# Patient Record
Sex: FEMALE | Race: BLACK OR AFRICAN AMERICAN | NOT HISPANIC OR LATINO | Employment: FULL TIME | URBAN - METROPOLITAN AREA
[De-identification: names, ages, dates, MRNs, and addresses within clinical notes are randomized per-mention and may not be internally consistent; named-entity substitution may affect disease eponyms.]

---

## 2019-07-29 ENCOUNTER — OFFICE VISIT (OUTPATIENT)
Dept: PRIMARY CARE CLINIC | Facility: CLINIC | Age: 69
End: 2019-07-29
Payer: COMMERCIAL

## 2019-07-29 ENCOUNTER — TELEPHONE (OUTPATIENT)
Dept: SURGERY | Facility: CLINIC | Age: 69
End: 2019-07-29

## 2019-07-29 VITALS
RESPIRATION RATE: 18 BRPM | OXYGEN SATURATION: 98 % | BODY MASS INDEX: 26.46 KG/M2 | TEMPERATURE: 98 F | HEART RATE: 60 BPM | HEIGHT: 64 IN | DIASTOLIC BLOOD PRESSURE: 79 MMHG | SYSTOLIC BLOOD PRESSURE: 178 MMHG | WEIGHT: 155 LBS

## 2019-07-29 DIAGNOSIS — Z12.39 BREAST CANCER SCREENING: ICD-10-CM

## 2019-07-29 DIAGNOSIS — L20.9 ATOPIC DERMATITIS, UNSPECIFIED TYPE: ICD-10-CM

## 2019-07-29 DIAGNOSIS — Z23 NEED FOR VACCINATION: ICD-10-CM

## 2019-07-29 DIAGNOSIS — N28.1 RENAL CYST, RIGHT: ICD-10-CM

## 2019-07-29 DIAGNOSIS — I10 ESSENTIAL HYPERTENSION, BENIGN: Primary | ICD-10-CM

## 2019-07-29 DIAGNOSIS — M89.9 DISORDER OF BONE: ICD-10-CM

## 2019-07-29 DIAGNOSIS — Z11.59 NEED FOR HEPATITIS C SCREENING TEST: ICD-10-CM

## 2019-07-29 DIAGNOSIS — E55.9 VITAMIN D DEFICIENCY: ICD-10-CM

## 2019-07-29 DIAGNOSIS — E78.5 HYPERLIPIDEMIA, UNSPECIFIED HYPERLIPIDEMIA TYPE: ICD-10-CM

## 2019-07-29 DIAGNOSIS — R10.9 LEFT FLANK PAIN: ICD-10-CM

## 2019-07-29 DIAGNOSIS — Z12.11 COLON CANCER SCREENING: ICD-10-CM

## 2019-07-29 LAB
BILIRUB SERPL-MCNC: ABNORMAL MG/DL
BLOOD URINE, POC: ABNORMAL
COLOR, POC UA: YELLOW
GLUCOSE UR QL STRIP: ABNORMAL
KETONES UR QL STRIP: ABNORMAL
LEUKOCYTE ESTERASE URINE, POC: ABNORMAL
NITRITE, POC UA: ABNORMAL
PH, POC UA: 6
PROTEIN, POC: ABNORMAL
SPECIFIC GRAVITY, POC UA: 1.01
UROBILINOGEN, POC UA: ABNORMAL

## 2019-07-29 PROCEDURE — 90714 TD VACC NO PRESV 7 YRS+ IM: CPT | Mod: S$GLB,,, | Performed by: FAMILY MEDICINE

## 2019-07-29 PROCEDURE — 81001 URINALYSIS AUTO W/SCOPE: CPT | Mod: S$GLB,,, | Performed by: FAMILY MEDICINE

## 2019-07-29 PROCEDURE — 90472 TD VACCINE GREATER THAN OR EQUAL TO 7YO PRESERVATIVE FREE IM: ICD-10-PCS | Mod: S$GLB,,, | Performed by: FAMILY MEDICINE

## 2019-07-29 PROCEDURE — 90670 PNEUMOCOCCAL CONJUGATE VACCINE 13-VALENT LESS THAN 5YO & GREATER THAN: ICD-10-PCS | Mod: S$GLB,,, | Performed by: FAMILY MEDICINE

## 2019-07-29 PROCEDURE — 81001 POCT URINALYSIS, DIPSTICK OR TABLET REAGENT, AUTOMATED, WITH MICROSCOP: ICD-10-PCS | Mod: S$GLB,,, | Performed by: FAMILY MEDICINE

## 2019-07-29 PROCEDURE — 90670 PCV13 VACCINE IM: CPT | Mod: S$GLB,,, | Performed by: FAMILY MEDICINE

## 2019-07-29 PROCEDURE — 90714 TD VACCINE GREATER THAN OR EQUAL TO 7YO PRESERVATIVE FREE IM: ICD-10-PCS | Mod: S$GLB,,, | Performed by: FAMILY MEDICINE

## 2019-07-29 PROCEDURE — 1101F PT FALLS ASSESS-DOCD LE1/YR: CPT | Mod: CPTII,S$GLB,, | Performed by: FAMILY MEDICINE

## 2019-07-29 PROCEDURE — 1101F PR PT FALLS ASSESS DOC 0-1 FALLS W/OUT INJ PAST YR: ICD-10-PCS | Mod: CPTII,S$GLB,, | Performed by: FAMILY MEDICINE

## 2019-07-29 PROCEDURE — 90472 IMMUNIZATION ADMIN EACH ADD: CPT | Mod: S$GLB,,, | Performed by: FAMILY MEDICINE

## 2019-07-29 PROCEDURE — 99999 PR PBB SHADOW E&M-NEW PATIENT-LVL V: CPT | Mod: PBBFAC,,, | Performed by: FAMILY MEDICINE

## 2019-07-29 PROCEDURE — 90471 IMMUNIZATION ADMIN: CPT | Mod: S$GLB,,, | Performed by: FAMILY MEDICINE

## 2019-07-29 PROCEDURE — 99999 PR PBB SHADOW E&M-NEW PATIENT-LVL V: ICD-10-PCS | Mod: PBBFAC,,, | Performed by: FAMILY MEDICINE

## 2019-07-29 PROCEDURE — 90471 PNEUMOCOCCAL CONJUGATE VACCINE 13-VALENT LESS THAN 5YO & GREATER THAN: ICD-10-PCS | Mod: S$GLB,,, | Performed by: FAMILY MEDICINE

## 2019-07-29 RX ORDER — LOSARTAN POTASSIUM AND HYDROCHLOROTHIAZIDE 12.5; 1 MG/1; MG/1
1 TABLET ORAL DAILY
COMMUNITY
End: 2019-07-29

## 2019-07-29 RX ORDER — HYDROCHLOROTHIAZIDE 12.5 MG/1
12.5 CAPSULE ORAL DAILY
Qty: 90 CAPSULE | Refills: 1 | Status: SHIPPED | OUTPATIENT
Start: 2019-07-29 | End: 2022-07-20

## 2019-07-29 RX ORDER — LOSARTAN POTASSIUM 100 MG/1
100 TABLET ORAL DAILY
Qty: 90 TABLET | Refills: 1 | Status: SHIPPED | OUTPATIENT
Start: 2019-07-29 | End: 2022-05-27 | Stop reason: SDUPTHER

## 2019-07-29 RX ORDER — TRIAMCINOLONE ACETONIDE 1 MG/G
OINTMENT TOPICAL 2 TIMES DAILY
Qty: 30 G | Refills: 1 | Status: SHIPPED | OUTPATIENT
Start: 2019-07-29 | End: 2022-07-20

## 2019-07-29 RX ORDER — ERGOCALCIFEROL 1.25 MG/1
50000 CAPSULE ORAL
COMMUNITY
End: 2019-08-07 | Stop reason: SDUPTHER

## 2019-07-29 NOTE — PROGRESS NOTES
Verified pt ID using name and . NKDA. Administered TD in left deltoid and pneumonia 23 in right deltoid per physician order using aseptic technique. Aspirated and no blood return noted. Pt tolerated well with no adverse reactions noted.

## 2019-07-29 NOTE — PROGRESS NOTES
"Subjective:       Patient ID: Adelina Ya is a 69 y.o. female.    Chief Complaint: Flank Pain (left sided flank pain x2 weeks); Recurrent Skin Infections (right lower leg ); and Hypertension (says she does not take her meds everyday )    Here to establish care. Says she does not take her BP meds regularly, says the combo of both meds makes her heart race, but felt fine when she took separately. Has lost ~8 pounds over the past years.  Complains of left flank pain off and on for the past 2 weeks.  Denies hematuria, dysuria or frequency.  Does have a history of a right renal cyst that was surgically removed several years ago, reportedly benign.    Review of Systems   Constitutional: Positive for unexpected weight change. Negative for appetite change, chills, fatigue and fever.   HENT: Negative for congestion.    Eyes: Negative for visual disturbance.   Respiratory: Negative for cough and shortness of breath.    Cardiovascular: Negative for chest pain.   Gastrointestinal: Negative for abdominal pain, blood in stool, nausea and vomiting.   Endocrine: Positive for heat intolerance.   Genitourinary: Negative for difficulty urinating.   Musculoskeletal: Negative for arthralgias.   Skin: Negative for rash.   Allergic/Immunologic: Negative for immunocompromised state.   Neurological: Negative for dizziness.   Hematological: Does not bruise/bleed easily.   Psychiatric/Behavioral: Negative for sleep disturbance.       Objective:      Vitals:    07/29/19 1142   BP: (!) 178/79   BP Location: Left arm   Patient Position: Sitting   BP Method: Medium (Automatic)   Pulse: 60   Resp: 18   Temp: 97.8 °F (36.6 °C)   TempSrc: Oral   SpO2: 98%   Weight: 70.3 kg (155 lb)   Height: 5' 4" (1.626 m)     Physical Exam   Constitutional: She is oriented to person, place, and time. She appears well-developed and well-nourished.   HENT:   Head: Normocephalic and atraumatic.   Eyes: Pupils are equal, round, and reactive to light. EOM are normal. "   Neck: Neck supple. No JVD present. Carotid bruit is not present.   Cardiovascular: Normal rate, regular rhythm and normal heart sounds.   Pulses:       Radial pulses are 2+ on the right side, and 2+ on the left side.   Pulmonary/Chest: Effort normal and breath sounds normal.   Abdominal: Soft. Bowel sounds are normal. She exhibits no distension. There is no tenderness. There is no CVA tenderness.   Musculoskeletal: She exhibits no edema.   Neurological: She is alert and oriented to person, place, and time.   Skin: Skin is warm and dry.        Psychiatric: She has a normal mood and affect. Her behavior is normal.   Nursing note and vitals reviewed.      Assessment:       1. Essential hypertension, benign    2. Breast cancer screening    3. Disorder of bone    4. Colon cancer screening    5. Hyperlipidemia, unspecified hyperlipidemia type    6. Vitamin D deficiency    7. Need for hepatitis C screening test    8. Renal cyst, right    9. Need for vaccination    10. Left flank pain    11. Atopic dermatitis, unspecified type        Plan:       Essential hypertension, benign  -     CBC auto differential; Future; Expected date: 07/29/2019  -     TSH; Future; Expected date: 07/29/2019  -     losartan (COZAAR) 100 MG tablet; Take 1 tablet (100 mg total) by mouth once daily.  Dispense: 90 tablet; Refill: 1  -     hydroCHLOROthiazide (MICROZIDE) 12.5 mg capsule; Take 1 capsule (12.5 mg total) by mouth once daily.  Dispense: 90 capsule; Refill: 1  Stressed importance of taking medications daily as prescribed  Breast cancer screening  -     Mammo Digital Screening Bilat without CA; Future; Expected date: 07/29/2019    Disorder of bone  -     DXA Bone Density Spine And Hip; Future; Expected date: 07/29/2019    Colon cancer screening  -     Ambulatory Referral to Colorectal Surgery    Hyperlipidemia, unspecified hyperlipidemia type  -     Comprehensive metabolic panel; Future; Expected date: 07/29/2019  -     Lipid panel; Future;  Expected date: 07/29/2019    Vitamin D deficiency  -     Vitamin D; Future; Expected date: 07/29/2019  -     PTH, intact; Future; Expected date: 07/29/2019    Need for hepatitis C screening test  -     Hepatitis C antibody; Future; Expected date: 07/29/2019    Renal cyst, right  -     POCT urinalysis, dipstick or tablet reag  -     US Retroperitoneal Complete (Kidney and; Future; Expected date: 07/29/2019    Need for vaccination  -     Pneumococcal Conjugate Vaccine (13 Valent) (IM)  -     Td Vaccine (Adult) - Preservative Free    Left flank pain  -     POCT urinalysis, dipstick or tablet reag  -     US Retroperitoneal Complete (Kidney and; Future; Expected date: 07/29/2019    Atopic dermatitis, unspecified type  -     triamcinolone acetonide 0.1% (KENALOG) 0.1 % ointment; Apply topically 2 (two) times daily.  Dispense: 30 g; Refill: 1      Medication List with Changes/Refills   New Medications    HYDROCHLOROTHIAZIDE (MICROZIDE) 12.5 MG CAPSULE    Take 1 capsule (12.5 mg total) by mouth once daily.    LOSARTAN (COZAAR) 100 MG TABLET    Take 1 tablet (100 mg total) by mouth once daily.    TRIAMCINOLONE ACETONIDE 0.1% (KENALOG) 0.1 % OINTMENT    Apply topically 2 (two) times daily.   Current Medications    ERGOCALCIFEROL (ERGOCALCIFEROL) 50,000 UNIT CAP    Take 50,000 Units by mouth every 7 days.    VIT A/VIT C/VIT E/ZINC/COPPER (PRESERVISION AREDS ORAL)    Take 1 capsule by mouth once daily.   Discontinued Medications    LOSARTAN-HYDROCHLOROTHIAZIDE 100-12.5 MG (HYZAAR) 100-12.5 MG TAB    Take 1 tablet by mouth once daily.

## 2019-07-29 NOTE — TELEPHONE ENCOUNTER
Called patient at all available numbers in reference to a referral for colon cancer screening, left message.

## 2019-07-30 ENCOUNTER — TELEPHONE (OUTPATIENT)
Dept: SURGERY | Facility: CLINIC | Age: 69
End: 2019-07-30

## 2019-08-07 DIAGNOSIS — E55.9 VITAMIN D DEFICIENCY: Primary | ICD-10-CM

## 2019-08-07 PROBLEM — E21.1 HYPERPARATHYROIDISM DUE TO VITAMIN D DEFICIENCY: Status: ACTIVE | Noted: 2019-08-07

## 2019-08-07 RX ORDER — ERGOCALCIFEROL 1.25 MG/1
50000 CAPSULE ORAL
Qty: 12 CAPSULE | Refills: 1 | Status: SHIPPED | OUTPATIENT
Start: 2019-08-07 | End: 2023-09-18

## 2019-08-07 NOTE — TELEPHONE ENCOUNTER
----- Message from Forrest Wilson sent at 8/7/2019 10:14 AM CDT -----  Contact: pt  Type:  Patient Returning Call    Who Called:  pt  Who Left Message for Patient:  unknown  Does the patient know what this is regarding?:  Possibly results  Best Call Back Number:  [phone]  Additional Information:

## 2019-08-07 NOTE — TELEPHONE ENCOUNTER
Patient says she has been taking her Vitamin D. Her last dose was on Monday. She is asking for a refill

## 2019-08-12 ENCOUNTER — CLINICAL SUPPORT (OUTPATIENT)
Dept: PRIMARY CARE CLINIC | Facility: CLINIC | Age: 69
End: 2019-08-12
Payer: COMMERCIAL

## 2019-08-12 ENCOUNTER — PATIENT OUTREACH (OUTPATIENT)
Dept: ADMINISTRATIVE | Facility: HOSPITAL | Age: 69
End: 2019-08-12

## 2019-08-12 VITALS — SYSTOLIC BLOOD PRESSURE: 144 MMHG | OXYGEN SATURATION: 99 % | DIASTOLIC BLOOD PRESSURE: 88 MMHG | HEART RATE: 67 BPM

## 2019-08-12 PROCEDURE — 99999 PR PBB SHADOW E&M-EST. PATIENT-LVL I: ICD-10-PCS | Mod: PBBFAC,,,

## 2019-08-12 PROCEDURE — 99999 PR PBB SHADOW E&M-EST. PATIENT-LVL I: CPT | Mod: PBBFAC,,,

## 2019-08-12 NOTE — PROGRESS NOTES
Verified pt ID using name and . Obtained bp, p, and sp02. Notified physician of results. Instructed pt to RTC in two weeks for another BP check. Pt verbalized understanding

## 2019-08-13 NOTE — PROGRESS NOTES
Immunizations reviewed. Legacy reviewed. Care Everywhere reviewed. Patient scheduled for Mammogram and DEXA Scan. Pre-visit chart review completed.

## 2019-08-30 ENCOUNTER — OFFICE VISIT (OUTPATIENT)
Dept: PRIMARY CARE CLINIC | Facility: CLINIC | Age: 69
End: 2019-08-30
Payer: COMMERCIAL

## 2019-08-30 VITALS
OXYGEN SATURATION: 98 % | HEIGHT: 64 IN | SYSTOLIC BLOOD PRESSURE: 168 MMHG | HEART RATE: 66 BPM | TEMPERATURE: 99 F | DIASTOLIC BLOOD PRESSURE: 90 MMHG | RESPIRATION RATE: 18 BRPM | WEIGHT: 155 LBS | BODY MASS INDEX: 26.46 KG/M2

## 2019-08-30 DIAGNOSIS — J01.00 ACUTE NON-RECURRENT MAXILLARY SINUSITIS: Primary | ICD-10-CM

## 2019-08-30 DIAGNOSIS — M85.851 OSTEOPENIA OF BOTH HIPS: ICD-10-CM

## 2019-08-30 DIAGNOSIS — I10 ESSENTIAL HYPERTENSION, BENIGN: ICD-10-CM

## 2019-08-30 DIAGNOSIS — M85.852 OSTEOPENIA OF BOTH HIPS: ICD-10-CM

## 2019-08-30 PROCEDURE — 99214 OFFICE O/P EST MOD 30 MIN: CPT | Mod: S$GLB,,, | Performed by: FAMILY MEDICINE

## 2019-08-30 PROCEDURE — 1101F PR PT FALLS ASSESS DOC 0-1 FALLS W/OUT INJ PAST YR: ICD-10-PCS | Mod: CPTII,S$GLB,, | Performed by: FAMILY MEDICINE

## 2019-08-30 PROCEDURE — 99999 PR PBB SHADOW E&M-EST. PATIENT-LVL III: CPT | Mod: PBBFAC,,, | Performed by: FAMILY MEDICINE

## 2019-08-30 PROCEDURE — 99999 PR PBB SHADOW E&M-EST. PATIENT-LVL III: ICD-10-PCS | Mod: PBBFAC,,, | Performed by: FAMILY MEDICINE

## 2019-08-30 PROCEDURE — 1101F PT FALLS ASSESS-DOCD LE1/YR: CPT | Mod: CPTII,S$GLB,, | Performed by: FAMILY MEDICINE

## 2019-08-30 PROCEDURE — 99214 PR OFFICE/OUTPT VISIT, EST, LEVL IV, 30-39 MIN: ICD-10-PCS | Mod: S$GLB,,, | Performed by: FAMILY MEDICINE

## 2019-08-30 RX ORDER — AMOXICILLIN AND CLAVULANATE POTASSIUM 875; 125 MG/1; MG/1
1 TABLET, FILM COATED ORAL 2 TIMES DAILY
Qty: 20 TABLET | Refills: 0 | Status: SHIPPED | OUTPATIENT
Start: 2019-08-30 | End: 2019-09-09

## 2019-08-30 RX ORDER — CODEINE PHOSPHATE AND GUAIFENESIN 10; 100 MG/5ML; MG/5ML
5 SOLUTION ORAL EVERY 6 HOURS PRN
Qty: 120 ML | Refills: 0 | Status: SHIPPED | OUTPATIENT
Start: 2019-08-30 | End: 2022-07-20

## 2019-08-30 NOTE — PROGRESS NOTES
"Subjective:       Patient ID: Adelina Ya is a 69 y.o. female.    Chief Complaint: Cough (coughing up yellow phlegm x2 days )    Sinusitis   This is a new problem. The current episode started in the past 7 days. The problem has been gradually worsening since onset. There has been no fever. Associated symptoms include congestion, coughing, headaches, sinus pressure and a sore throat. Pertinent negatives include no shortness of breath. Past treatments include oral decongestants. The treatment provided no relief.   mammogram normal, DXA showed bilateral osteopenia of hips  Review of Systems   Constitutional: Negative for fever.   HENT: Positive for congestion, sinus pressure and sore throat.    Respiratory: Positive for cough. Negative for shortness of breath.    Cardiovascular: Negative for chest pain.   Musculoskeletal: Positive for arthralgias.   Allergic/Immunologic: Negative for immunocompromised state.   Neurological: Positive for headaches.       Objective:      Vitals:    08/30/19 1139   BP: (!) 168/90   BP Location: Right arm   Patient Position: Sitting   BP Method: Medium (Manual)   Pulse: 66   Resp: 18   Temp: 98.5 °F (36.9 °C)   TempSrc: Oral   SpO2: 98%   Weight: 70.3 kg (155 lb)   Height: 5' 4" (1.626 m)     Physical Exam   Constitutional: She is oriented to person, place, and time. She appears well-developed and well-nourished.   HENT:   Head: Normocephalic and atraumatic.   Right Ear: Tympanic membrane normal.   Left Ear: Tympanic membrane normal.   Nose: Right sinus exhibits maxillary sinus tenderness. Left sinus exhibits maxillary sinus tenderness.   Mouth/Throat: Oropharynx is clear and moist.   Eyes: Pupils are equal, round, and reactive to light. EOM are normal.   Neck: Neck supple. No JVD present.   Cardiovascular: Normal rate, regular rhythm and normal heart sounds.   Pulmonary/Chest: Effort normal and breath sounds normal.   Musculoskeletal: She exhibits no edema.   Neurological: She is alert " and oriented to person, place, and time.   Skin: Skin is warm and dry.   Psychiatric: She has a normal mood and affect. Her behavior is normal.   Nursing note and vitals reviewed.      Assessment:       1. Acute non-recurrent maxillary sinusitis    2. Osteopenia of both hips    3. Essential hypertension, benign        Plan:       Acute non-recurrent maxillary sinusitis  -     amoxicillin-clavulanate 875-125mg (AUGMENTIN) 875-125 mg per tablet; Take 1 tablet by mouth 2 (two) times daily. for 10 days  Dispense: 20 tablet; Refill: 0  -     guaifenesin-codeine 100-10 mg/5 ml (TUSSI-ORGANIDIN NR)  mg/5 mL syrup; Take 5 mLs by mouth every 6 (six) hours as needed for Cough.  Dispense: 120 mL; Refill: 0    Osteopenia of both hips  Cont supplements, exercise  Essential hypertension, benign  Didn't take BP meds today. RTC 2 weeks for BP check    Medication List with Changes/Refills   New Medications    AMOXICILLIN-CLAVULANATE 875-125MG (AUGMENTIN) 875-125 MG PER TABLET    Take 1 tablet by mouth 2 (two) times daily. for 10 days    GUAIFENESIN-CODEINE 100-10 MG/5 ML (TUSSI-ORGANIDIN NR)  MG/5 ML SYRUP    Take 5 mLs by mouth every 6 (six) hours as needed for Cough.   Current Medications    ERGOCALCIFEROL (ERGOCALCIFEROL) 50,000 UNIT CAP    Take 1 capsule (50,000 Units total) by mouth every 7 days.    HYDROCHLOROTHIAZIDE (MICROZIDE) 12.5 MG CAPSULE    Take 1 capsule (12.5 mg total) by mouth once daily.    LOSARTAN (COZAAR) 100 MG TABLET    Take 1 tablet (100 mg total) by mouth once daily.    TRIAMCINOLONE ACETONIDE 0.1% (KENALOG) 0.1 % OINTMENT    Apply topically 2 (two) times daily.    VIT A/VIT C/VIT E/ZINC/COPPER (PRESERVISION AREDS ORAL)    Take 1 capsule by mouth once daily.

## 2019-11-11 ENCOUNTER — TELEPHONE (OUTPATIENT)
Dept: PRIMARY CARE CLINIC | Facility: CLINIC | Age: 69
End: 2019-11-11

## 2019-11-11 NOTE — TELEPHONE ENCOUNTER
----- Message from Sree Mancia MD sent at 11/8/2019  7:59 AM CST -----  Vitamin-D level is still low, but improving.  Continue supplementation.  Follow-up in about 6 months, sooner as needed.

## 2019-11-11 NOTE — LETTER
[facility]  [address], [facility] [address]  [facility] [address]  Phone: [phone]  Fax: [phone] November 11, 2019     Adelina Ya  [address] [facility] [address]      :    Efforts to reach you have been unsuccessful.    Please phone our office at [phone] so that we can speak to you about the results of your test done recently.    We look forward to hearing from you soon.          Sree Mancia MD

## 2019-11-11 NOTE — TELEPHONE ENCOUNTER
Multiple attempts to reach patient Vm left to return our call concerning results also letter sent to home address on file.

## 2019-11-27 ENCOUNTER — TELEPHONE (OUTPATIENT)
Dept: PRIMARY CARE CLINIC | Facility: CLINIC | Age: 69
End: 2019-11-27

## 2019-11-27 NOTE — TELEPHONE ENCOUNTER
----- Message from Juanita Bah sent at 11/27/2019  9:35 AM CST -----  Contact: Patient  Type:  Patient Returning Call    Who Called:  Adelina  Who Left Message for Patient:  Gricelda Richey Brittany C.  Does the patient know what this is regarding?:  Lab results  Best Call Back Number:  [phone]  Additional Information:  Missed your call, please call her back. Thanks.

## 2020-03-30 ENCOUNTER — TELEPHONE (OUTPATIENT)
Dept: PRIMARY CARE CLINIC | Facility: CLINIC | Age: 70
End: 2020-03-30

## 2020-03-30 DIAGNOSIS — R68.89 FLU-LIKE SYMPTOMS: Primary | ICD-10-CM

## 2020-03-30 NOTE — TELEPHONE ENCOUNTER
----- Message from Fernanda Hoover sent at 3/30/2020 10:30 AM CDT -----  Contact: Patient [phone]  Patient is experiencing dizziness, loss of appetite and loss of taste and smell.    Please call and advise.    Thank You

## 2020-03-30 NOTE — TELEPHONE ENCOUNTER
Patient is experiencing dizziness, loss of appetite and loss of taste and smell, cough, and chills x1 week. No SOB or myalgias. I instructed the patient to quarantine herself for 14 days from the onset of her symptoms and to go to the ED for any worsening symptoms. Any other recommendations? I think she would benefit from the symptom tracker, as well.

## 2020-03-30 NOTE — TELEPHONE ENCOUNTER
Spoke to pt about concerns of flu like symptoms. Feeling week. No fevers, positive chills. No SOB but anosmia. She likely has Covid and is asked to stay home and self quarantine for 14 days. Lives with son who she is asked to distance herself from. If she develops significant SOB she is told to go to ER. Otherwise to avoid clinics and hospitals. Ordering covid tracker.

## 2020-05-03 ENCOUNTER — TELEPHONE (OUTPATIENT)
Dept: PRIMARY CARE CLINIC | Facility: CLINIC | Age: 70
End: 2020-05-03

## 2020-07-02 DIAGNOSIS — Z12.39 BREAST CANCER SCREENING: ICD-10-CM

## 2020-12-16 ENCOUNTER — TELEPHONE (OUTPATIENT)
Dept: PRIMARY CARE CLINIC | Facility: CLINIC | Age: 70
End: 2020-12-16

## 2020-12-17 NOTE — TELEPHONE ENCOUNTER
Patient says she was laid off in March and is still trying to get her insurance straight. She says she will call back to schedule

## 2022-05-25 ENCOUNTER — TELEPHONE (OUTPATIENT)
Dept: PRIMARY CARE CLINIC | Facility: CLINIC | Age: 72
End: 2022-05-25
Payer: COMMERCIAL

## 2022-05-27 DIAGNOSIS — I10 ESSENTIAL HYPERTENSION, BENIGN: ICD-10-CM

## 2022-05-27 RX ORDER — LOSARTAN POTASSIUM 100 MG/1
100 TABLET ORAL DAILY
Qty: 90 TABLET | Refills: 0 | Status: SHIPPED | OUTPATIENT
Start: 2022-05-27 | End: 2022-07-20

## 2022-05-27 NOTE — TELEPHONE ENCOUNTER
No new care gaps identified.  [facility] Embedded Care Gaps. Reference number: [id number]. 5/27/2022   10:26:18 AM NATT

## 2022-05-27 NOTE — TELEPHONE ENCOUNTER
----- Message from Edna Christian LPN sent at 5/27/2022 10:20 AM CDT -----  Good morning, I called the patient to schedule a follow up since she has not been seen since 2019. I scheduled her for the first available on 7/20/2022. The patient is requesting a refill of Losartan 100 mg. I explained to the patient they Dr. Mancia may only give her enough until her appointment, so she need to make sure that she keep that appointment.

## 2022-07-20 ENCOUNTER — OFFICE VISIT (OUTPATIENT)
Dept: PRIMARY CARE CLINIC | Facility: CLINIC | Age: 72
End: 2022-07-20
Payer: COMMERCIAL

## 2022-07-20 VITALS
RESPIRATION RATE: 18 BRPM | HEIGHT: 64 IN | SYSTOLIC BLOOD PRESSURE: 182 MMHG | BODY MASS INDEX: 26.91 KG/M2 | WEIGHT: 157.63 LBS | HEART RATE: 65 BPM | DIASTOLIC BLOOD PRESSURE: 102 MMHG | OXYGEN SATURATION: 98 %

## 2022-07-20 DIAGNOSIS — Z12.31 SCREENING MAMMOGRAM, ENCOUNTER FOR: ICD-10-CM

## 2022-07-20 DIAGNOSIS — Z12.11 COLON CANCER SCREENING: ICD-10-CM

## 2022-07-20 DIAGNOSIS — R21 RASH: ICD-10-CM

## 2022-07-20 DIAGNOSIS — Z00.00 ANNUAL PHYSICAL EXAM: Primary | ICD-10-CM

## 2022-07-20 DIAGNOSIS — Z23 NEED FOR VACCINATION: ICD-10-CM

## 2022-07-20 DIAGNOSIS — I10 ESSENTIAL HYPERTENSION, BENIGN: ICD-10-CM

## 2022-07-20 DIAGNOSIS — R73.03 PREDIABETES: ICD-10-CM

## 2022-07-20 DIAGNOSIS — E78.5 HYPERLIPIDEMIA, UNSPECIFIED HYPERLIPIDEMIA TYPE: ICD-10-CM

## 2022-07-20 LAB — GLUCOSE SERPL-MCNC: 88 MG/DL (ref 70–110)

## 2022-07-20 PROCEDURE — 99397 PER PM REEVAL EST PAT 65+ YR: CPT | Mod: 25,S$GLB,, | Performed by: FAMILY MEDICINE

## 2022-07-20 PROCEDURE — 99999 PR PBB SHADOW E&M-EST. PATIENT-LVL IV: CPT | Mod: PBBFAC,,, | Performed by: FAMILY MEDICINE

## 2022-07-20 PROCEDURE — 99999 PR PBB SHADOW E&M-EST. PATIENT-LVL IV: ICD-10-PCS | Mod: PBBFAC,,, | Performed by: FAMILY MEDICINE

## 2022-07-20 PROCEDURE — 90732 PNEUMOCOCCAL POLYSACCHARIDE VACCINE 23-VALENT =>2YO SQ IM: ICD-10-PCS | Mod: S$GLB,,, | Performed by: FAMILY MEDICINE

## 2022-07-20 PROCEDURE — 93005 ELECTROCARDIOGRAM TRACING: CPT | Mod: S$GLB,,, | Performed by: FAMILY MEDICINE

## 2022-07-20 PROCEDURE — 90471 PNEUMOCOCCAL POLYSACCHARIDE VACCINE 23-VALENT =>2YO SQ IM: ICD-10-PCS | Mod: S$GLB,,, | Performed by: FAMILY MEDICINE

## 2022-07-20 PROCEDURE — 93005 EKG 12-LEAD: ICD-10-PCS | Mod: S$GLB,,, | Performed by: FAMILY MEDICINE

## 2022-07-20 PROCEDURE — 82962 POCT GLUCOSE, HAND-HELD DEVICE: ICD-10-PCS | Mod: S$GLB,,, | Performed by: FAMILY MEDICINE

## 2022-07-20 PROCEDURE — 93010 EKG 12-LEAD: ICD-10-PCS | Mod: S$GLB,,, | Performed by: INTERNAL MEDICINE

## 2022-07-20 PROCEDURE — 90471 IMMUNIZATION ADMIN: CPT | Mod: S$GLB,,, | Performed by: FAMILY MEDICINE

## 2022-07-20 PROCEDURE — 93010 ELECTROCARDIOGRAM REPORT: CPT | Mod: S$GLB,,, | Performed by: INTERNAL MEDICINE

## 2022-07-20 PROCEDURE — 82962 GLUCOSE BLOOD TEST: CPT | Mod: S$GLB,,, | Performed by: FAMILY MEDICINE

## 2022-07-20 PROCEDURE — 99397 PR PREVENTIVE VISIT,EST,65 & OVER: ICD-10-PCS | Mod: 25,S$GLB,, | Performed by: FAMILY MEDICINE

## 2022-07-20 PROCEDURE — 90732 PPSV23 VACC 2 YRS+ SUBQ/IM: CPT | Mod: S$GLB,,, | Performed by: FAMILY MEDICINE

## 2022-07-20 RX ORDER — OLMESARTAN MEDOXOMIL AND HYDROCHLOROTHIAZIDE 40/25 40; 25 MG/1; MG/1
1 TABLET ORAL DAILY
Qty: 90 TABLET | Refills: 0 | Status: SHIPPED | OUTPATIENT
Start: 2022-07-20 | End: 2024-03-01 | Stop reason: SDUPTHER

## 2022-07-20 NOTE — PROGRESS NOTES
Verified pt by name and . NKDA. Per physician orders pt was administered Pneumococcal 23 Polysaccharide injection IM to right deltoid using aseptic technique. Pt tolerated well. No chief complaints or adverse effects reported. MD notified.

## 2022-07-20 NOTE — PROGRESS NOTES
"Subjective:       Patient ID: Adelina Ya is a 72 y.o. female.    Chief Complaint: Annual Exam    Lost insurance, so hasn't had routine medical care past 2-3 years. Started back on BP meds last month. A1C 6% on "life screening" at outside facility last year. Says she has been having occasional dizzy spells and has been urinating a lot more than usual, especially at night, for the past several months. Currently fasting.    Review of Systems   Constitutional: Negative for chills, fatigue and fever.   HENT: Negative for congestion.    Eyes: Negative for visual disturbance.   Respiratory: Negative for cough and shortness of breath.    Cardiovascular: Negative for chest pain.   Gastrointestinal: Negative for abdominal pain, nausea and vomiting.   Endocrine: Positive for polydipsia and polyuria.   Genitourinary: Negative for difficulty urinating.   Musculoskeletal: Negative for arthralgias.   Skin: Positive for rash.   Allergic/Immunologic: Negative for immunocompromised state.   Neurological: Positive for dizziness.   Hematological: Does not bruise/bleed easily.   Psychiatric/Behavioral: Negative for agitation and confusion.       Objective:      Vitals:    07/20/22 0756   BP: (!) 182/102   BP Location: Right arm   Patient Position: Sitting   BP Method: Medium (Manual)   Pulse: 65   Resp: 18   SpO2: 98%   Weight: 71.5 kg (157 lb 10.1 oz)   Height: 5' 4" (1.626 m)     Physical Exam  Vitals and nursing note reviewed.   Constitutional:       Appearance: She is well-developed.   HENT:      Head: Normocephalic and atraumatic.   Eyes:      Pupils: Pupils are equal, round, and reactive to light.   Neck:      Vascular: No carotid bruit or JVD.   Cardiovascular:      Rate and Rhythm: Normal rate and regular rhythm.      Pulses:           Radial pulses are 2+ on the right side and 2+ on the left side.      Heart sounds: Normal heart sounds.   Pulmonary:      Effort: Pulmonary effort is normal.      Breath sounds: Normal breath " sounds.   Abdominal:      General: Bowel sounds are normal. There is no distension.      Palpations: Abdomen is soft.      Tenderness: There is no abdominal tenderness.   Musculoskeletal:      Cervical back: Neck supple.      Right lower leg: Edema (trace) present.      Left lower leg: Edema (trace) present.   Skin:     General: Skin is warm and dry.   Neurological:      Mental Status: She is alert and oriented to person, place, and time.   Psychiatric:         Behavior: Behavior normal.         Lab Results   Component Value Date    WBC 4.10 08/05/2019    HGB 13.3 08/05/2019    HCT 41.8 08/05/2019     08/05/2019    CHOL 202 (H) 08/05/2019    TRIG 51 08/05/2019    HDL 63 08/05/2019    ALT 14 08/05/2019    AST 19 08/05/2019     08/05/2019    K 4.6 08/05/2019     08/05/2019    CREATININE 0.7 08/05/2019    BUN 14 08/05/2019    CO2 28 08/05/2019    TSH 1.54 08/05/2019      Assessment:       1. Annual physical exam    2. Screening mammogram, encounter for    3. Essential hypertension, benign    4. Hyperlipidemia, unspecified hyperlipidemia type    5. Prediabetes    6. Colon cancer screening    7. Need for vaccination    8. Rash        Plan:       Annual physical exam    Screening mammogram, encounter for  -     Mammo Digital Screening Bilat w/ Jeremy; Future; Expected date: 07/20/2022    Essential hypertension, benign  -     CBC Auto Differential; Future; Expected date: 07/20/2022  -     Comprehensive Metabolic Panel; Future; Expected date: 07/20/2022  -     TSH; Future; Expected date: 07/20/2022  -     EKG 12-lead  -     olmesartan-hydrochlorothiazide (BENICAR HCT) 40-25 mg per tablet; Take 1 tablet by mouth once daily.  Dispense: 90 tablet; Refill: 0  Blood pressure suboptimally controlled.  Switch to more potent agent in combination with HCTZ.  Follow-up in 2 weeks for blood pressure check  Hyperlipidemia, unspecified hyperlipidemia type  -     Lipid Panel; Future; Expected date: 07/20/2022  -     TSH;  Future; Expected date: 07/20/2022    Prediabetes  -     POCT Glucose, Hand-Held Device  -     Hemoglobin A1C; Future; Expected date: 07/20/2022  -     TSH; Future; Expected date: 07/20/2022  Low carb diet, exercise  Colon cancer screening  -     Case Request Endoscopy: COLONOSCOPY    Need for vaccination  -     Pneumococcal Polysaccharide Vaccine (23 Valent) (SQ/IM)    Rash  -     Ambulatory referral/consult to Dermatology; Future; Expected date: 07/27/2022      Medication List with Changes/Refills   New Medications    OLMESARTAN-HYDROCHLOROTHIAZIDE (BENICAR HCT) 40-25 MG PER TABLET    Take 1 tablet by mouth once daily.   Current Medications    ERGOCALCIFEROL (ERGOCALCIFEROL) 50,000 UNIT CAP    Take 1 capsule (50,000 Units total) by mouth every 7 days.   Discontinued Medications    GUAIFENESIN-CODEINE 100-10 MG/5 ML (TUSSI-ORGANIDIN NR)  MG/5 ML SYRUP    Take 5 mLs by mouth every 6 (six) hours as needed for Cough.    HYDROCHLOROTHIAZIDE (MICROZIDE) 12.5 MG CAPSULE    Take 1 capsule (12.5 mg total) by mouth once daily.    LOSARTAN (COZAAR) 100 MG TABLET    Take 1 tablet (100 mg total) by mouth once daily.    TRIAMCINOLONE ACETONIDE 0.1% (KENALOG) 0.1 % OINTMENT    Apply topically 2 (two) times daily.    VIT A/VIT C/VIT E/ZINC/COPPER (PRESERVISION AREDS ORAL)    Take 1 capsule by mouth once daily.

## 2022-07-29 ENCOUNTER — TELEPHONE (OUTPATIENT)
Dept: SURGERY | Facility: CLINIC | Age: 72
End: 2022-07-29
Payer: COMMERCIAL

## 2022-07-29 RX ORDER — SODIUM, POTASSIUM,MAG SULFATES 17.5-3.13G
1 SOLUTION, RECONSTITUTED, ORAL ORAL DAILY
Qty: 1 KIT | Refills: 0 | Status: SHIPPED | OUTPATIENT
Start: 2022-07-29 | End: 2022-07-31

## 2022-07-29 NOTE — TELEPHONE ENCOUNTER
Called patient in reference to a referral to Colorectal Surgery for colon cancer screening. Patient verbally consented to a Colonoscopy and requested to be scheduled for a Colonoscopy on 10/06/2022 Patient was advised a designated  is required on the day of the Colonoscopy to drive the patient home and the  must be at least. 18 years old.Colonoscopy Prep instructions were thoroughly explained and discussed with the patient.   It was emphasized, and reiterated to the patient, not to follow the prep instructions that comes with the Prep Kit. However, to please follow the prep instructions that will be received in the mail from the [facility]   Patient acknowledges understanding Prep instructions as explained and discussed on the phone.. Patient was advised the Colonoscopy Prep instructions discussed and explained on the phone,are being mailed out to the patient's verified address on file. Patient's address on file was verified with the patient for accuracy of mailing. Patient's medications on file was reviewed with the patient for accuracy of information. Patient denies taking  any other medications other than those listed and verified on medication profile.Patient was explained the Colonoscopy will be performed here at [facility]. Patient was further explained the Pre-Op will call one day prior to the procedure date, to discuss Pre-Op instructions;and what time to report for the Colonoscopy. The patient was given the opportunity to ask any questions about the Colonoscopy.       Bowel Prep/SUPREP instructions                                               [facility]    [address] [place] [address]      You are scheduled for a Colonoscopy with _______________________ on ____________________   At [facility] in [place].    Check in at the hospital on 1st floor Registration area next to Emergency room.    Please call [phone] to  reschedule or if you have any questions.    An adult friend/family member must come with you to drive you home.  You cannot drive, take a taxi, Uber/Lyft or bus to leave the Hospital alone. If you do not have someone with you to drive you home, your test will be cancelled.       Please follow the directions of your doctor if you take any pills that thin your blood.  If you take these meds: Aggrenox, Brilinta, Effient, Eliquis, Lovenox, Plavix, Pletal Pradaxa ticilid, Xarelto, or Coumadin, let the doctor's office know.    Don't: On the morning of the test do not take insulin or pills for diabetes.   Do: On the morning of the test, do take any pills for blood pressure, heart, anti-rejection and or seizures with a small sip of water. Bring any inhalers with you day of procedure.    To have a good prep, you must follow these instructions- please do not use the directions from the pharmacy!      The doctor will send a prescription for the SUPREP   The day Before the test:   You can only drink CLEAR LIQUIDS the whole day before your test. You can't eat any food for the whole day.    You CAN have:   Water,Coffee or decaf coffee (no milk or cream)    Tea   Soft drinks- regular and sugar free   Jell-O (green or yellow)   Apple Juice, grape juice, white cranberry juice   Gatorade, Power Aid, Crystal Light, Thai Aid   Lemonade and Limeade   Bouillon, clear soup   Snowball, popsicles   YOU CAN'T DRINK ANYTHING RED   YOU CAN'T DRINK ALCOHOL   ONLY DRINK WHAT IS ON THIS List      At 5pm the night before your test:   Pour the 1st bottle of SUPREP into the cup provided in the box.  Add water to the line on the cup and mix well. Drink the whole cup and then drink 2 more full cups of water over the 1 hour.    This can be easier to drink if it is cold.  You can mix it 20 minutes ahead of time and place in the refrigerator before you drink it.  You must drink it within 30-45 minutes of mixing it. Do NOT pour the drink over ice. You can  drink it with a straw.    The Day of the test- We will call you 1 day before your test to tell you what time to get there.    5 hours before you come to the hospital (this may be the middle of the night)   Pour the 2nd bottle of SUPREP into to the cup provided in the box. Add water to the line on the cup and mix well. Drink the whole cup and then drink 2 more full cups of water over 1 hour.    It might be easier to drink if it is cold. You can mix it 20 minutes ahead of time and place in the refrigerator before you drink it. You must drink it within 30-45 minutes of mixing it. Do NOT pour the drink over ice. You can drink it with a straw.   YOU CAN'T EAT OR DRINK ANYTHING ELSE ONCE YOU FINISH THE PREP.   Leave all valuables and jewelry at home. You will be at the hospital for 2-4 hours.    Call the Endoscopy Scheduling Department at [phone] with any questions about your procedure.    Please  your medication from your local pharmacy. If you are unable to  the SUPREP Kit please contact our office.   Thank you   Endo Scheduling Dept   [facility]

## 2022-10-05 DIAGNOSIS — Z78.0 MENOPAUSE: ICD-10-CM

## 2022-10-18 NOTE — TELEPHONE ENCOUNTER
Called patient regarding her elevated BP. She says she does not check it at home and does not have a portal. I was in the middle of making her an appointment at the end of the month when the phone got disconnected. I tried calling back then it went straight to . I left a message for her to return my call to schedule   
resilient/elastic

## 2023-08-03 ENCOUNTER — TELEPHONE (OUTPATIENT)
Dept: PRIMARY CARE CLINIC | Facility: CLINIC | Age: 73
End: 2023-08-03
Payer: COMMERCIAL

## 2023-08-03 DIAGNOSIS — Z12.31 BREAST CANCER SCREENING BY MAMMOGRAM: ICD-10-CM

## 2023-08-03 NOTE — TELEPHONE ENCOUNTER
----- Message from Delmar Santamaria sent at 8/3/2023  4:22 PM CDT -----  Type:  Patient Call    Who Called:p    Does the patient know what this is regarding?:Mammo orders  Would the patient rather a call back or a response via [facility]? Call  Best Call Back Number:[phone]  Additional Information: pt has mammogram orders in the system, when  going to scheduled an message pops up saying the orders are closed. Please place new orders in the system

## 2023-08-14 ENCOUNTER — HOSPITAL ENCOUNTER (OUTPATIENT)
Dept: RADIOLOGY | Facility: OTHER | Age: 73
Discharge: HOME | End: 2023-08-14
Attending: FAMILY MEDICINE
Payer: COMMERCIAL

## 2023-08-14 VITALS — BODY MASS INDEX: 28.72 KG/M2 | WEIGHT: 157 LBS

## 2023-08-14 DIAGNOSIS — Z12.31 BREAST CANCER SCREENING BY MAMMOGRAM: ICD-10-CM

## 2023-08-14 PROCEDURE — 77067 SCR MAMMO BI INCL CAD: CPT | Mod: TC

## 2023-08-14 PROCEDURE — 77067 MAMMO DIGITAL SCREENING BILAT WITH TOMO: ICD-10-PCS | Mod: 26,,, | Performed by: RADIOLOGY

## 2023-08-14 PROCEDURE — 77067 SCR MAMMO BI INCL CAD: CPT | Mod: 26,,, | Performed by: RADIOLOGY

## 2023-08-14 PROCEDURE — 77063 MAMMO DIGITAL SCREENING BILAT WITH TOMO: ICD-10-PCS | Mod: 26,,, | Performed by: RADIOLOGY

## 2023-08-14 PROCEDURE — 77063 BREAST TOMOSYNTHESIS BI: CPT | Mod: 26,,, | Performed by: RADIOLOGY

## 2023-09-18 ENCOUNTER — OFFICE VISIT (OUTPATIENT)
Dept: PRIMARY CARE CLINIC | Facility: CLINIC | Age: 73
End: 2023-09-18
Payer: COMMERCIAL

## 2023-09-18 VITALS
SYSTOLIC BLOOD PRESSURE: 128 MMHG | RESPIRATION RATE: 16 BRPM | DIASTOLIC BLOOD PRESSURE: 82 MMHG | HEIGHT: 62 IN | OXYGEN SATURATION: 99 % | TEMPERATURE: 98 F | WEIGHT: 154.44 LBS | BODY MASS INDEX: 28.42 KG/M2 | HEART RATE: 67 BPM

## 2023-09-18 DIAGNOSIS — M85.851 OSTEOPENIA OF BOTH HIPS: ICD-10-CM

## 2023-09-18 DIAGNOSIS — E55.9 VITAMIN D DEFICIENCY: ICD-10-CM

## 2023-09-18 DIAGNOSIS — R10.84 GENERALIZED ABDOMINAL PAIN: ICD-10-CM

## 2023-09-18 DIAGNOSIS — R10.9 CHRONIC ABDOMINAL PAIN: Primary | ICD-10-CM

## 2023-09-18 DIAGNOSIS — G89.29 CHRONIC ABDOMINAL PAIN: Primary | ICD-10-CM

## 2023-09-18 DIAGNOSIS — I10 ESSENTIAL HYPERTENSION, BENIGN: ICD-10-CM

## 2023-09-18 DIAGNOSIS — E21.1 HYPERPARATHYROIDISM DUE TO VITAMIN D DEFICIENCY: ICD-10-CM

## 2023-09-18 DIAGNOSIS — M85.852 OSTEOPENIA OF BOTH HIPS: ICD-10-CM

## 2023-09-18 DIAGNOSIS — R73.03 PREDIABETES: ICD-10-CM

## 2023-09-18 DIAGNOSIS — E78.5 HYPERLIPIDEMIA, UNSPECIFIED HYPERLIPIDEMIA TYPE: ICD-10-CM

## 2023-09-18 PROCEDURE — 1160F PR REVIEW ALL MEDS BY PRESCRIBER/CLIN PHARMACIST DOCUMENTED: ICD-10-PCS | Mod: CPTII,S$GLB,, | Performed by: FAMILY MEDICINE

## 2023-09-18 PROCEDURE — 3008F BODY MASS INDEX DOCD: CPT | Mod: CPTII,S$GLB,, | Performed by: FAMILY MEDICINE

## 2023-09-18 PROCEDURE — 99214 PR OFFICE/OUTPT VISIT, EST, LEVL IV, 30-39 MIN: ICD-10-PCS | Mod: S$GLB,,, | Performed by: FAMILY MEDICINE

## 2023-09-18 PROCEDURE — 99214 OFFICE O/P EST MOD 30 MIN: CPT | Mod: S$GLB,,, | Performed by: FAMILY MEDICINE

## 2023-09-18 PROCEDURE — 99999 PR PBB SHADOW E&M-EST. PATIENT-LVL IV: ICD-10-PCS | Mod: PBBFAC,,, | Performed by: FAMILY MEDICINE

## 2023-09-18 PROCEDURE — 1101F PT FALLS ASSESS-DOCD LE1/YR: CPT | Mod: CPTII,S$GLB,, | Performed by: FAMILY MEDICINE

## 2023-09-18 PROCEDURE — 1126F PR PAIN SEVERITY QUANTIFIED, NO PAIN PRESENT: ICD-10-PCS | Mod: CPTII,S$GLB,, | Performed by: FAMILY MEDICINE

## 2023-09-18 PROCEDURE — 99999 PR PBB SHADOW E&M-EST. PATIENT-LVL IV: CPT | Mod: PBBFAC,,, | Performed by: FAMILY MEDICINE

## 2023-09-18 PROCEDURE — 3074F PR MOST RECENT SYSTOLIC BLOOD PRESSURE < 130 MM HG: ICD-10-PCS | Mod: CPTII,S$GLB,, | Performed by: FAMILY MEDICINE

## 2023-09-18 PROCEDURE — 3288F FALL RISK ASSESSMENT DOCD: CPT | Mod: CPTII,S$GLB,, | Performed by: FAMILY MEDICINE

## 2023-09-18 PROCEDURE — 3079F PR MOST RECENT DIASTOLIC BLOOD PRESSURE 80-89 MM HG: ICD-10-PCS | Mod: CPTII,S$GLB,, | Performed by: FAMILY MEDICINE

## 2023-09-18 PROCEDURE — 1159F MED LIST DOCD IN RCRD: CPT | Mod: CPTII,S$GLB,, | Performed by: FAMILY MEDICINE

## 2023-09-18 PROCEDURE — 1101F PR PT FALLS ASSESS DOC 0-1 FALLS W/OUT INJ PAST YR: ICD-10-PCS | Mod: CPTII,S$GLB,, | Performed by: FAMILY MEDICINE

## 2023-09-18 PROCEDURE — 3074F SYST BP LT 130 MM HG: CPT | Mod: CPTII,S$GLB,, | Performed by: FAMILY MEDICINE

## 2023-09-18 PROCEDURE — 1159F PR MEDICATION LIST DOCUMENTED IN MEDICAL RECORD: ICD-10-PCS | Mod: CPTII,S$GLB,, | Performed by: FAMILY MEDICINE

## 2023-09-18 PROCEDURE — 3288F PR FALLS RISK ASSESSMENT DOCUMENTED: ICD-10-PCS | Mod: CPTII,S$GLB,, | Performed by: FAMILY MEDICINE

## 2023-09-18 PROCEDURE — 1160F RVW MEDS BY RX/DR IN RCRD: CPT | Mod: CPTII,S$GLB,, | Performed by: FAMILY MEDICINE

## 2023-09-18 PROCEDURE — 3008F PR BODY MASS INDEX (BMI) DOCUMENTED: ICD-10-PCS | Mod: CPTII,S$GLB,, | Performed by: FAMILY MEDICINE

## 2023-09-18 PROCEDURE — 1126F AMNT PAIN NOTED NONE PRSNT: CPT | Mod: CPTII,S$GLB,, | Performed by: FAMILY MEDICINE

## 2023-09-18 PROCEDURE — 3079F DIAST BP 80-89 MM HG: CPT | Mod: CPTII,S$GLB,, | Performed by: FAMILY MEDICINE

## 2023-09-18 RX ORDER — ERGOCALCIFEROL 1.25 MG/1
50000 CAPSULE ORAL
Qty: 12 CAPSULE | Refills: 1 | Status: CANCELLED | OUTPATIENT
Start: 2023-09-18

## 2023-09-18 NOTE — PROGRESS NOTES
"Subjective:       Patient ID: Adelina Ya is a 73 y.o. female.    Chief Complaint: Abdominal Pain (LUQ (ABD side)/Started 2 months ago ) and Pelvic Pain (Started 1 month ago  )    Has been having diffuse lower abdominal/pelvic pain for the past several months off and on, pain worse before BM, feels better after. Frequent nausea, no vomiting or diarrhea. No constipation. No melena or hematochezia.      Review of Systems   Constitutional:  Negative for fever.   HENT:  Negative for trouble swallowing.    Respiratory:  Negative for shortness of breath.    Cardiovascular:  Negative for chest pain.   Gastrointestinal:  Positive for abdominal pain and nausea. Negative for blood in stool, constipation, diarrhea and vomiting.   Genitourinary:  Positive for pelvic pain. Negative for difficulty urinating and dysuria.   Allergic/Immunologic: Negative for immunocompromised state.   Hematological:  Does not bruise/bleed easily.   Psychiatric/Behavioral:  Negative for agitation and confusion.        Objective:      Vitals:    09/18/23 1034   BP: 128/82   BP Location: Right arm   Patient Position: Sitting   BP Method: Medium (Manual)   Pulse: 67   Resp: 16   Temp: 97.9 °F (36.6 °C)   TempSrc: Temporal   SpO2: 99%   Weight: 70.1 kg (154 lb 6.9 oz)   Height: 5' 2" (1.575 m)     BP Readings from Last 5 Encounters:   09/18/23 128/82   11/30/22 (!) 166/81   10/06/22 (!) 166/74   07/20/22 (!) 182/102   08/30/19 (!) 168/90     Wt Readings from Last 5 Encounters:   09/18/23 70.1 kg (154 lb 6.9 oz)   08/14/23 71.2 kg (157 lb)   11/30/22 71.4 kg (157 lb 6.5 oz)   10/06/22 69.9 kg (154 lb 1.6 oz)   07/20/22 71.5 kg (157 lb 10.1 oz)     Physical Exam  Vitals and nursing note reviewed.   Constitutional:       General: She is not in acute distress.     Appearance: Normal appearance. She is well-developed.   HENT:      Head: Normocephalic and atraumatic.   Cardiovascular:      Rate and Rhythm: Normal rate and regular rhythm.      Heart sounds: " Normal heart sounds.   Pulmonary:      Effort: Pulmonary effort is normal.      Breath sounds: Normal breath sounds.   Abdominal:      General: Bowel sounds are normal. There is no distension.      Palpations: There is no mass.      Tenderness: There is abdominal tenderness (generalized). There is no guarding or rebound.   Musculoskeletal:      Right lower leg: No edema.      Left lower leg: No edema.   Skin:     General: Skin is warm and dry.   Neurological:      Mental Status: She is alert and oriented to person, place, and time.   Psychiatric:         Mood and Affect: Mood normal.         Behavior: Behavior normal.         Lab Results   Component Value Date    WBC 13.47 (H) 11/30/2022    HGB 12.7 11/30/2022    HCT 40.9 11/30/2022     11/30/2022    CHOL 220 (H) 07/20/2022    TRIG 46 07/20/2022    HDL 52 07/20/2022    ALT 13 11/30/2022    AST 18 11/30/2022     (L) 11/30/2022    K 4.0 11/30/2022     11/30/2022    CREATININE 0.7 11/30/2022    BUN 11 11/30/2022    CO2 22 (L) 11/30/2022    TSH 1.242 07/20/2022    HGBA1C 5.7 (H) 07/20/2022      Assessment:       1. Chronic abdominal pain    2. Vitamin D deficiency    3. Prediabetes    4. Hyperparathyroidism due to vitamin D deficiency    5. Essential hypertension, benign    6. Osteopenia of both hips    7. Hyperlipidemia, unspecified hyperlipidemia type    8. Generalized abdominal pain        Plan:       Chronic abdominal pain  -     CT Abdomen Pelvis W Wo Contrast; Future; Expected date: 09/18/2023    Vitamin D deficiency  -     Vitamin D; Future; Expected date: 09/18/2023  -     PTH, intact; Future; Expected date: 09/18/2023    Prediabetes  -     Hemoglobin A1C; Future; Expected date: 09/18/2023    Hyperparathyroidism due to vitamin D deficiency  -     Vitamin D; Future; Expected date: 09/18/2023  -     PTH, intact; Future; Expected date: 09/18/2023    Essential hypertension, benign  -     CBC Auto Differential; Future; Expected date:  09/18/2023    Osteopenia of both hips  -     DXA Bone Density Axial Skeleton 1 or more sites; Future; Expected date: 09/25/2023    Hyperlipidemia, unspecified hyperlipidemia type  -     CBC Auto Differential; Future; Expected date: 09/18/2023  -     Comprehensive Metabolic Panel; Future; Expected date: 09/18/2023  -     Lipid Panel; Future; Expected date: 09/18/2023    Generalized abdominal pain  -     CT Abdomen Pelvis W Wo Contrast; Future; Expected date: 09/18/2023      Medication List with Changes/Refills   Current Medications    OLMESARTAN-HYDROCHLOROTHIAZIDE (BENICAR HCT) 40-25 MG PER TABLET    Take 1 tablet by mouth once daily.   Discontinued Medications    ERGOCALCIFEROL (ERGOCALCIFEROL) 50,000 UNIT CAP    Take 1 capsule (50,000 Units total) by mouth every 7 days.    ONDANSETRON (ZOFRAN-ODT) 4 MG TBDL    Take 1 tablet (4 mg total) by mouth 3 (three) times daily.

## 2023-09-27 ENCOUNTER — TELEPHONE (OUTPATIENT)
Dept: PRIMARY CARE CLINIC | Facility: CLINIC | Age: 73
End: 2023-09-27
Payer: COMMERCIAL

## 2023-09-27 DIAGNOSIS — G89.29 CHRONIC ABDOMINAL PAIN: Primary | ICD-10-CM

## 2023-09-27 DIAGNOSIS — R10.9 CHRONIC ABDOMINAL PAIN: Primary | ICD-10-CM

## 2023-09-27 DIAGNOSIS — M81.0 AGE-RELATED OSTEOPOROSIS WITHOUT CURRENT PATHOLOGICAL FRACTURE: ICD-10-CM

## 2023-09-27 RX ORDER — ALENDRONATE SODIUM 70 MG/1
70 TABLET ORAL
Qty: 12 TABLET | Refills: 3 | Status: SHIPPED | OUTPATIENT
Start: 2023-09-27

## 2023-09-27 RX ORDER — LYSINE HCL 500 MG
1 TABLET ORAL 2 TIMES DAILY
Qty: 180 TABLET | Refills: 3 | Status: SHIPPED | OUTPATIENT
Start: 2023-09-27

## 2023-09-27 NOTE — TELEPHONE ENCOUNTER
----- Message from Darcie Staleymahi sent at 9/27/2023 11:28 AM CDT -----  Contact: [phone]  [email or web address]     Patient is calling for Medical Advice regarding: stomach pain getting worse     How long has patient had these symptoms: since last mentioned     Pharmacy name and phone#:  [id number] [place] - [address]  [address] [place] [address]  Phone: [phone] Fax: [phone]        Would like response via [employer]:  call back     Comments:    Pt says she tried going to work today and had to come back home due to the pain being so great. She says she has tried over the counter medicines with no relief. She is willing to come in today, but nothing available. Please Advise

## 2023-09-27 NOTE — TELEPHONE ENCOUNTER
No significant abnormalities noted on CT.  Recommend consultation with GI given her chronic abdominal pain.  Also, DEXA scan shows evidence of osteoporosis of the hip, putting her at increased risk of fracture.  Recommend weekly Fosamax and daily calcium and vitamin-D supplementation.

## 2023-09-27 NOTE — TELEPHONE ENCOUNTER
Called pt regarding message. Pt stated she is still having abd pain. Pt stated she tried OTC medication and it isn't helping. Pt is requesting CT results.

## 2023-09-28 NOTE — TELEPHONE ENCOUNTER
Called pt regarding message. Informed pt no significant abnormalities notes on CT. Recommend pt consult with GI doctor.Also, DEXA scan shows evidence of osteoporosis of the hip, putting her at increased risk of fracture.  Recommend weekly Fosamax and daily calcium and vitamin-D supplementation.Pt verbalized understanding.

## 2023-11-28 ENCOUNTER — OFFICE VISIT (OUTPATIENT)
Dept: GASTROENTEROLOGY | Facility: CLINIC | Age: 73
End: 2023-11-28
Payer: COMMERCIAL

## 2023-11-28 VITALS
HEART RATE: 76 BPM | HEIGHT: 62 IN | OXYGEN SATURATION: 100 % | SYSTOLIC BLOOD PRESSURE: 191 MMHG | DIASTOLIC BLOOD PRESSURE: 83 MMHG | BODY MASS INDEX: 29.21 KG/M2 | WEIGHT: 158.75 LBS

## 2023-11-28 DIAGNOSIS — R10.9 CHRONIC ABDOMINAL PAIN: ICD-10-CM

## 2023-11-28 DIAGNOSIS — G89.29 CHRONIC ABDOMINAL PAIN: ICD-10-CM

## 2023-11-28 PROCEDURE — 1126F AMNT PAIN NOTED NONE PRSNT: CPT | Mod: CPTII,S$GLB,, | Performed by: STUDENT IN AN ORGANIZED HEALTH CARE EDUCATION/TRAINING PROGRAM

## 2023-11-28 PROCEDURE — 1101F PT FALLS ASSESS-DOCD LE1/YR: CPT | Mod: CPTII,S$GLB,, | Performed by: STUDENT IN AN ORGANIZED HEALTH CARE EDUCATION/TRAINING PROGRAM

## 2023-11-28 PROCEDURE — 3008F PR BODY MASS INDEX (BMI) DOCUMENTED: ICD-10-PCS | Mod: CPTII,S$GLB,, | Performed by: STUDENT IN AN ORGANIZED HEALTH CARE EDUCATION/TRAINING PROGRAM

## 2023-11-28 PROCEDURE — 3044F HG A1C LEVEL LT 7.0%: CPT | Mod: CPTII,S$GLB,, | Performed by: STUDENT IN AN ORGANIZED HEALTH CARE EDUCATION/TRAINING PROGRAM

## 2023-11-28 PROCEDURE — 99999 PR PBB SHADOW E&M-EST. PATIENT-LVL IV: CPT | Mod: PBBFAC,,, | Performed by: STUDENT IN AN ORGANIZED HEALTH CARE EDUCATION/TRAINING PROGRAM

## 2023-11-28 PROCEDURE — 99214 PR OFFICE/OUTPT VISIT, EST, LEVL IV, 30-39 MIN: ICD-10-PCS | Mod: S$GLB,,, | Performed by: STUDENT IN AN ORGANIZED HEALTH CARE EDUCATION/TRAINING PROGRAM

## 2023-11-28 PROCEDURE — 3079F PR MOST RECENT DIASTOLIC BLOOD PRESSURE 80-89 MM HG: ICD-10-PCS | Mod: CPTII,S$GLB,, | Performed by: STUDENT IN AN ORGANIZED HEALTH CARE EDUCATION/TRAINING PROGRAM

## 2023-11-28 PROCEDURE — 3044F PR MOST RECENT HEMOGLOBIN A1C LEVEL <7.0%: ICD-10-PCS | Mod: CPTII,S$GLB,, | Performed by: STUDENT IN AN ORGANIZED HEALTH CARE EDUCATION/TRAINING PROGRAM

## 2023-11-28 PROCEDURE — 99214 OFFICE O/P EST MOD 30 MIN: CPT | Mod: S$GLB,,, | Performed by: STUDENT IN AN ORGANIZED HEALTH CARE EDUCATION/TRAINING PROGRAM

## 2023-11-28 PROCEDURE — 3008F BODY MASS INDEX DOCD: CPT | Mod: CPTII,S$GLB,, | Performed by: STUDENT IN AN ORGANIZED HEALTH CARE EDUCATION/TRAINING PROGRAM

## 2023-11-28 PROCEDURE — 1126F PR PAIN SEVERITY QUANTIFIED, NO PAIN PRESENT: ICD-10-PCS | Mod: CPTII,S$GLB,, | Performed by: STUDENT IN AN ORGANIZED HEALTH CARE EDUCATION/TRAINING PROGRAM

## 2023-11-28 PROCEDURE — 99999 PR PBB SHADOW E&M-EST. PATIENT-LVL IV: ICD-10-PCS | Mod: PBBFAC,,, | Performed by: STUDENT IN AN ORGANIZED HEALTH CARE EDUCATION/TRAINING PROGRAM

## 2023-11-28 PROCEDURE — 3077F SYST BP >= 140 MM HG: CPT | Mod: CPTII,S$GLB,, | Performed by: STUDENT IN AN ORGANIZED HEALTH CARE EDUCATION/TRAINING PROGRAM

## 2023-11-28 PROCEDURE — 1159F MED LIST DOCD IN RCRD: CPT | Mod: CPTII,S$GLB,, | Performed by: STUDENT IN AN ORGANIZED HEALTH CARE EDUCATION/TRAINING PROGRAM

## 2023-11-28 PROCEDURE — 3288F PR FALLS RISK ASSESSMENT DOCUMENTED: ICD-10-PCS | Mod: CPTII,S$GLB,, | Performed by: STUDENT IN AN ORGANIZED HEALTH CARE EDUCATION/TRAINING PROGRAM

## 2023-11-28 PROCEDURE — 3077F PR MOST RECENT SYSTOLIC BLOOD PRESSURE >= 140 MM HG: ICD-10-PCS | Mod: CPTII,S$GLB,, | Performed by: STUDENT IN AN ORGANIZED HEALTH CARE EDUCATION/TRAINING PROGRAM

## 2023-11-28 PROCEDURE — 1159F PR MEDICATION LIST DOCUMENTED IN MEDICAL RECORD: ICD-10-PCS | Mod: CPTII,S$GLB,, | Performed by: STUDENT IN AN ORGANIZED HEALTH CARE EDUCATION/TRAINING PROGRAM

## 2023-11-28 PROCEDURE — 3079F DIAST BP 80-89 MM HG: CPT | Mod: CPTII,S$GLB,, | Performed by: STUDENT IN AN ORGANIZED HEALTH CARE EDUCATION/TRAINING PROGRAM

## 2023-11-28 PROCEDURE — 3288F FALL RISK ASSESSMENT DOCD: CPT | Mod: CPTII,S$GLB,, | Performed by: STUDENT IN AN ORGANIZED HEALTH CARE EDUCATION/TRAINING PROGRAM

## 2023-11-28 PROCEDURE — 1101F PR PT FALLS ASSESS DOC 0-1 FALLS W/OUT INJ PAST YR: ICD-10-PCS | Mod: CPTII,S$GLB,, | Performed by: STUDENT IN AN ORGANIZED HEALTH CARE EDUCATION/TRAINING PROGRAM

## 2023-11-28 NOTE — PROGRESS NOTES
Formerly named [facility]    Reason for visit: The encounter diagnosis was Chronic abdominal pain.  Referring Provider/PCP: Sree Mancia MD    History of Present Illness:  Adelina Ya is a 73 y.o. female with a history of hyperparathyroidism, osteopenia, hyperlipidemia, hypertension who is presenting for initial evaluation of abdominal pain.  The patient is somewhat of a poor historian.  The patient reports that for the past few months she has been having back pain that radiates to her abdomen when she sits down, gets out of bed or turns on her side.  She also describes deep pelvic pain when she sits down to have a bowel movement, although it is not as severe as the back pain.  Does not get worse with eating.  It does not happen all the time, and is not always relieved by bowel movements.  Denies any abdominal pain, nausea, vomiting, diarrhea or constipation.  No blood in her stool.  She underwent recent blood work including CBC, CMP on 09/25, personally reviewed, all normal.  She had a screening colonoscopy in October of last year, images reviewed, normal colonoscopy, repeat recommended in 5 years.    She had a CT abdomen pelvis done by PCP, images reviewed , no cause for abdominal pain identified.      Physical Exam:  Constitutional:  not in acute distress and well developed  HENT: Head: Normal, normocephalic, atraumatic.  Eyes: conjunctiva clear and sclera nonicteric  GI: soft, non-tender, without masses or organomegaly  Skin: normal color  Neurological: alert, oriented x3  Psychiatric: mood and affect are within normal limits, pt is a good historian; no memory problems were noted    Laboratory:  See HPI    Imaging:  See HPI    Endoscopy:  See HPI    Assessment:  Adelina Ya is a 73 y.o. female who is presenting for initial evaluation of back and abdominal pain.    Problems:  Back pain  Lower pelvic pain    I suspect that the patient has musculoskeletal/neuropathic pain in the back radiating to her  Dr. Lucie Ortega and this RN have spoken with patient's , Kasia Paez via phone call. Not present at hospital at this time. lower abdomen since her pain is not related to bowel movements or eating.  Recommend a trial of ib guard for possible IBS.  No indication for EGD since her symptoms are lower, and she had a recent colonoscopy, recent CT was normal.      Plan:  Ib Lance  Back pain management per pcp  F/u in 3-4 months    Christina Cain MD  Gastroenterology and Hepatology    No orders of the defined types were placed in this encounter.

## 2024-03-01 DIAGNOSIS — I10 ESSENTIAL HYPERTENSION, BENIGN: ICD-10-CM

## 2024-03-01 RX ORDER — OLMESARTAN MEDOXOMIL AND HYDROCHLOROTHIAZIDE 40/25 40; 25 MG/1; MG/1
1 TABLET ORAL DAILY
Qty: 90 TABLET | Refills: 0 | Status: SHIPPED | OUTPATIENT
Start: 2024-03-01

## 2024-03-01 NOTE — TELEPHONE ENCOUNTER
Refill Routing Note   Medication(s) are not appropriate for processing by [facility] for the following reason(s):        No active prescription written by provider  Required vitals abnormal    [facility] action(s):  Defer               Appointments  past 12m or future 3m with PCP    Date Provider   Last Visit   9/18/2023 Sree Mancia MD   Next Visit   4/10/2024 Sree Mancia MD   ED visits in past 90 days: 1        Note composed:5:13 PM 03/01/2024

## 2024-03-01 NOTE — TELEPHONE ENCOUNTER
Care Due:                  Date            Visit Type   Department     Provider  --------------------------------------------------------------------------------                                EP -                              PRIMARY [facility]  Last Visit: 09-      CARE ([facility])   PRIMARY CARE   Sree Mancia                               -                              PRIMARY      [facility]  Next Visit: 04-      CARE ([facility])   PRIMARY CARE   Sree Mancia                                                            Last  Test          Frequency    Reason                     Performed    Due Date  --------------------------------------------------------------------------------    Mg Level....  12 months..  alendronate..............  Not Found    Overdue    Phosphate...  12 months..  alendronate..............  Not Found    Overdue    [employer] Embedded Care Due Messages. Reference number: [id number].   3/01/2024 2:53:04 PM CST

## 2024-03-01 NOTE — TELEPHONE ENCOUNTER
----- Message from Phyllis Chapin sent at 3/1/2024  1:08 PM CST -----  Contact: [phone]  Requesting an RX refill or new RX.  Is this a refill or new RX: refill  RX name and strength:  olmesartan-hydrochlorothiazide (BENICAR HCT) 40-25 mg per tablet 90 tablet  Is this a 30 day or 90 day RX: 90  Pharmacy name and phone # :  [facility] - [place] - [address]   Phone: [phone]  Fax: [phone]        The doctors have asked that we provide their patients with the following 2 reminders -- prescription refills can take up to 72 hours, and a friendly reminder that in the future you can use your [facility] account to request refills: Patient is out of her medication

## 2024-03-01 NOTE — TELEPHONE ENCOUNTER
Provider Staff- Action is required     If a refill request needs assessment, Please pend appropriate medication(s) for patient and route the refill request to the Centralized Refill Staff Pool for assessment.     If medication is already pended in a previous encounter, please add any call/ encounter notes to that previous encounter and route that encounter to the [facility] staff pool High Priority.     If medication is not pended as a Refill Request the data required for the Refill Center to assess will not generate and the medications will not be able to be assessed properly by the Refill Center.     Thank you for your assistance!   [facility]     Note composed:1:46 PM 03/01/2024

## 2024-03-05 ENCOUNTER — OFFICE VISIT (OUTPATIENT)
Dept: GASTROENTEROLOGY | Facility: CLINIC | Age: 74
End: 2024-03-05
Payer: COMMERCIAL

## 2024-03-05 VITALS
HEART RATE: 67 BPM | BODY MASS INDEX: 27.77 KG/M2 | WEIGHT: 156.75 LBS | SYSTOLIC BLOOD PRESSURE: 167 MMHG | OXYGEN SATURATION: 98 % | DIASTOLIC BLOOD PRESSURE: 84 MMHG | HEIGHT: 63 IN

## 2024-03-05 DIAGNOSIS — M79.18 MUSCULOSKELETAL PAIN: Primary | ICD-10-CM

## 2024-03-05 PROCEDURE — 1101F PT FALLS ASSESS-DOCD LE1/YR: CPT | Mod: CPTII,S$GLB,, | Performed by: STUDENT IN AN ORGANIZED HEALTH CARE EDUCATION/TRAINING PROGRAM

## 2024-03-05 PROCEDURE — 3079F DIAST BP 80-89 MM HG: CPT | Mod: CPTII,S$GLB,, | Performed by: STUDENT IN AN ORGANIZED HEALTH CARE EDUCATION/TRAINING PROGRAM

## 2024-03-05 PROCEDURE — 1126F AMNT PAIN NOTED NONE PRSNT: CPT | Mod: CPTII,S$GLB,, | Performed by: STUDENT IN AN ORGANIZED HEALTH CARE EDUCATION/TRAINING PROGRAM

## 2024-03-05 PROCEDURE — 99999 PR PBB SHADOW E&M-EST. PATIENT-LVL IV: CPT | Mod: PBBFAC,,, | Performed by: STUDENT IN AN ORGANIZED HEALTH CARE EDUCATION/TRAINING PROGRAM

## 2024-03-05 PROCEDURE — 3288F FALL RISK ASSESSMENT DOCD: CPT | Mod: CPTII,S$GLB,, | Performed by: STUDENT IN AN ORGANIZED HEALTH CARE EDUCATION/TRAINING PROGRAM

## 2024-03-05 PROCEDURE — 99213 OFFICE O/P EST LOW 20 MIN: CPT | Mod: S$GLB,,, | Performed by: STUDENT IN AN ORGANIZED HEALTH CARE EDUCATION/TRAINING PROGRAM

## 2024-03-05 PROCEDURE — 3077F SYST BP >= 140 MM HG: CPT | Mod: CPTII,S$GLB,, | Performed by: STUDENT IN AN ORGANIZED HEALTH CARE EDUCATION/TRAINING PROGRAM

## 2024-03-05 PROCEDURE — 3008F BODY MASS INDEX DOCD: CPT | Mod: CPTII,S$GLB,, | Performed by: STUDENT IN AN ORGANIZED HEALTH CARE EDUCATION/TRAINING PROGRAM

## 2024-03-05 PROCEDURE — 1159F MED LIST DOCD IN RCRD: CPT | Mod: CPTII,S$GLB,, | Performed by: STUDENT IN AN ORGANIZED HEALTH CARE EDUCATION/TRAINING PROGRAM

## 2024-03-05 RX ORDER — POLYETHYLENE GLYCOL 3350 17 G/17G
17 POWDER, FOR SOLUTION ORAL DAILY
Qty: 90 PACKET | Refills: 0 | Status: SHIPPED | OUTPATIENT
Start: 2024-03-05 | End: 2024-05-13

## 2024-03-05 RX ORDER — BENZONATATE 100 MG/1
100 CAPSULE ORAL 3 TIMES DAILY
COMMUNITY
Start: 2024-02-29 | End: 2024-05-13

## 2024-03-05 NOTE — PROGRESS NOTES
"   [facility]    Reason for visit: There were no encounter diagnoses.  Referring Provider/PCP: Sree Mancia MD    History of Present Illness:  Adelina Ya is a 73 y.o. female with a history of hyperparathyroidism, osteopenia, hyperlipidemia, hypertension who is presenting for follow-up evaluation of abdominal pain.    I saw her last in November at which time she was reporting for back pain that radiates to her abdomen when she sits down, gets out of bed or turns on her side that was going on for few months. She also reported deep pelvic pain when she sits down to have a bowel movement, although it is not as severe as the back pain. Does not get worse with eating. It does not happen all the time, and is not always relieved by bowel movements. Denies any abdominal pain, nausea, vomiting, diarrhea or constipation.   She had a screening colonoscopy in October of last year, images reviewed, normal colonoscopy, repeat recommended in 5 years.    She had a CT abdomen pelvis done by PCP, images reviewed , no cause for abdominal pain identified. Labwork unremarkable.    She was likely having musculoskeletal/neuropathic pain in the back radiating to the lower abdomen to be managed by PCP.  I recommended a trial of ib guard for possible IBS.  Today, the patient is still somewhat of a poor historian.  She reports that her back pain continues as well as the deep pelvic pain when she has a bowel movement and it gets worse when she tries to get up from a sitting position.  She has having a bowel movement every 2-3 days with occasional straining.  No abdominal pain per se.  She reports occasional increased "water" in her mouth.  Denies feeling like she is regurgitating it, no SCD D and no vomiting.  She says that all of a sudden she gets excessive water in her mouth.        Physical Exam:  Constitutional:  not in acute distress and well developed  HENT: Head: Normal, normocephalic, atraumatic.  Eyes: " conjunctiva clear and sclera nonicteric  Skin: normal color  Neurological: alert, oriented x3  Psychiatric: mood and affect are within normal limits, pt is a good historian; no memory problems were noted    Laboratory:  Lab Results   Component Value Date/Time    HGB 13.2 01/08/2024 08:34 AM    HGB 12.8 09/25/2023 06:46 AM    AST 16 01/08/2024 08:34 AM    AST 16 09/25/2023 06:46 AM    ALT 13 01/08/2024 08:34 AM    ALT 11 09/25/2023 06:46 AM    BILITOT 0.4 01/08/2024 08:34 AM    BILITOT 0.3 09/25/2023 06:46 AM     Reviewed.  Normal CBC, LFTs    Imaging:  See HPI.    Endoscopy:  See HPI    Assessment:  Adelina Ya is a 73 y.o. female who is presenting for follow-up evaluation of back pain, lower abdominal pain.    Problems:  Musculoskeletal pain  Constipation  Hypersalivation    The patient reports that she continues to have the back pain radiating to her lower abdomen, it appears to be musculoskeletal/neuropathic to me.  Low suspicion for a GI cause especially that it is not relieved by bowel movement and not worsened by food.  She may have mild constipation for which I recommend a daily MiraLax.  She may benefit from physical therapy through PCP.    Today she describes excessive water in her mouth that occurs suddenly not related to meals.  I suspected his hypersalivation but it does not sound pathologic.  Denies dysphagia, regurgitation, vomiting or heartburn.      Plan:  Miralax daily  Follow up with PCP for musculoskeletal pain, possibly PT  F/u PRN    Christina Cain MD  Gastroenterology and Hepatology    No orders of the defined types were placed in this encounter.       No Repair - Repaired With Adjacent Surgical Defect Text (Leave Blank If You Do Not Want): After obtaining clear surgical margins the defect was repaired concurrently with another surgical defect which was in close approximation.

## 2024-03-05 NOTE — PATIENT INSTRUCTIONS
Take miralax as needed for constipation  Talk to your primary care doctor about treating your musculoskeletal pain

## 2024-05-13 ENCOUNTER — OFFICE VISIT (OUTPATIENT)
Dept: PRIMARY CARE CLINIC | Facility: CLINIC | Age: 74
End: 2024-05-13
Payer: COMMERCIAL

## 2024-05-13 VITALS
WEIGHT: 155.56 LBS | TEMPERATURE: 98 F | OXYGEN SATURATION: 100 % | RESPIRATION RATE: 18 BRPM | SYSTOLIC BLOOD PRESSURE: 148 MMHG | HEIGHT: 63 IN | BODY MASS INDEX: 27.56 KG/M2 | HEART RATE: 60 BPM | DIASTOLIC BLOOD PRESSURE: 86 MMHG

## 2024-05-13 DIAGNOSIS — E21.1 HYPERPARATHYROIDISM DUE TO VITAMIN D DEFICIENCY: ICD-10-CM

## 2024-05-13 DIAGNOSIS — K11.7 HYPERSALIVATION: Primary | ICD-10-CM

## 2024-05-13 DIAGNOSIS — E55.9 VITAMIN D DEFICIENCY: ICD-10-CM

## 2024-05-13 DIAGNOSIS — R73.03 PREDIABETES: ICD-10-CM

## 2024-05-13 DIAGNOSIS — I10 ESSENTIAL HYPERTENSION, BENIGN: ICD-10-CM

## 2024-05-13 PROCEDURE — 1126F AMNT PAIN NOTED NONE PRSNT: CPT | Mod: CPTII,S$GLB,, | Performed by: FAMILY MEDICINE

## 2024-05-13 PROCEDURE — 1159F MED LIST DOCD IN RCRD: CPT | Mod: CPTII,S$GLB,, | Performed by: FAMILY MEDICINE

## 2024-05-13 PROCEDURE — 1160F RVW MEDS BY RX/DR IN RCRD: CPT | Mod: CPTII,S$GLB,, | Performed by: FAMILY MEDICINE

## 2024-05-13 PROCEDURE — 3077F SYST BP >= 140 MM HG: CPT | Mod: CPTII,S$GLB,, | Performed by: FAMILY MEDICINE

## 2024-05-13 PROCEDURE — 1101F PT FALLS ASSESS-DOCD LE1/YR: CPT | Mod: CPTII,S$GLB,, | Performed by: FAMILY MEDICINE

## 2024-05-13 PROCEDURE — 3079F DIAST BP 80-89 MM HG: CPT | Mod: CPTII,S$GLB,, | Performed by: FAMILY MEDICINE

## 2024-05-13 PROCEDURE — 3008F BODY MASS INDEX DOCD: CPT | Mod: CPTII,S$GLB,, | Performed by: FAMILY MEDICINE

## 2024-05-13 PROCEDURE — 99214 OFFICE O/P EST MOD 30 MIN: CPT | Mod: S$GLB,,, | Performed by: FAMILY MEDICINE

## 2024-05-13 PROCEDURE — 3288F FALL RISK ASSESSMENT DOCD: CPT | Mod: CPTII,S$GLB,, | Performed by: FAMILY MEDICINE

## 2024-05-13 PROCEDURE — 99999 PR PBB SHADOW E&M-EST. PATIENT-LVL IV: CPT | Mod: PBBFAC,,, | Performed by: FAMILY MEDICINE

## 2024-05-13 RX ORDER — AMLODIPINE BESYLATE 5 MG/1
5 TABLET ORAL DAILY
Qty: 30 TABLET | Refills: 0 | Status: SHIPPED | OUTPATIENT
Start: 2024-05-13

## 2024-05-13 RX ORDER — GLYCOPYRROLATE 1 MG/1
1 TABLET ORAL 2 TIMES DAILY
Qty: 60 TABLET | Refills: 5 | Status: SHIPPED | OUTPATIENT
Start: 2024-05-13

## 2024-05-13 NOTE — PROGRESS NOTES
"Subjective:       Patient ID: Adelina Ya is a 73 y.o. female.    Chief Complaint: Cough (Started 3 months ago), Abdominal Pain (Started 3 months ago), and Mass (Left inner thigh )    Nontender, hard "bump" to left upper inner thigh for the past few months. No known injury. Size up and down, no drainage. No color change or skin breakdown.  Also c/o mouth "filling up with water" for a few months, has to keep swallowing.    Cough  This is a new problem. The current episode started more than 1 month ago. The problem has been gradually improving. The cough is Non-productive. Pertinent negatives include no fever, hemoptysis, shortness of breath or wheezing.   Abdominal Pain  Associated symptoms include constipation and nausea (occasional). Pertinent negatives include no diarrhea, fever or vomiting.   Mass  Associated symptoms include abdominal pain, coughing and nausea (occasional). Pertinent negatives include no fever or vomiting.     Review of Systems   Constitutional:  Negative for fever.   HENT:  Negative for trouble swallowing.    Respiratory:  Positive for cough. Negative for hemoptysis, choking, shortness of breath and wheezing.    Gastrointestinal:  Positive for abdominal pain, constipation and nausea (occasional). Negative for blood in stool, diarrhea and vomiting.   Hematological:  Does not bruise/bleed easily.   Psychiatric/Behavioral:  Negative for agitation and confusion.        Objective:      Vitals:    05/13/24 1558   BP: (!) 148/86   BP Location: Right arm   Patient Position: Sitting   BP Method: Medium (Manual)   Pulse: 60   Resp: 18   Temp: 97.8 °F (36.6 °C)   TempSrc: Temporal   SpO2: 100%   Weight: 70.5 kg (155 lb 8.6 oz)   Height: 5' 3" (1.6 m)     BP Readings from Last 5 Encounters:   05/13/24 (!) 148/86   03/05/24 (!) 167/84   01/08/24 (!) 181/84   11/28/23 (!) 191/83   09/18/23 128/82     Wt Readings from Last 5 Encounters:   05/13/24 70.5 kg (155 lb 8.6 oz)   03/05/24 71.1 kg (156 lb 12 oz) "   01/08/24 71.4 kg (157 lb 6.5 oz)   11/28/23 72 kg (158 lb 11.7 oz)   09/18/23 70.1 kg (154 lb 6.9 oz)     Physical Exam    Lab Results   Component Value Date    WBC 5.32 01/08/2024    HGB 13.2 01/08/2024    HCT 42.7 01/08/2024     01/08/2024    CHOL 205 (H) 09/25/2023    TRIG 44 09/25/2023    HDL 55 09/25/2023    ALT 13 01/08/2024    AST 16 01/08/2024     01/08/2024    K 3.8 01/08/2024     01/08/2024    CREATININE 0.7 01/08/2024    BUN 10 01/08/2024    CO2 24 01/08/2024    TSH 1.242 07/20/2022    HGBA1C 5.6 09/25/2023      Assessment:       1. Hypersalivation    2. Hyperparathyroidism due to vitamin D deficiency    3. Vitamin D deficiency    4. Essential hypertension, benign    5. Prediabetes        Plan:       Hypersalivation  -     glycopyrrolate (ROBINUL) 1 mg Tab; Take 1 tablet (1 mg total) by mouth 2 (two) times daily.  Dispense: 60 tablet; Refill: 5  Counseled regarding potential side effects of medication  Hyperparathyroidism due to vitamin D deficiency  Stable  Vitamin D deficiency    Essential hypertension, benign  -     amLODIPine (NORVASC) 5 MG tablet; Take 1 tablet (5 mg total) by mouth once daily.  Dispense: 30 tablet; Refill: 0  Blood pressure suboptimally controlled.  Add amlodipine and return to clinic in 2 weeks for blood pressure check  Prediabetes      Medication List with Changes/Refills   New Medications    AMLODIPINE (NORVASC) 5 MG TABLET    Take 1 tablet (5 mg total) by mouth once daily.    GLYCOPYRROLATE (ROBINUL) 1 MG TAB    Take 1 tablet (1 mg total) by mouth 2 (two) times daily.   Current Medications    ALENDRONATE (FOSAMAX) 70 MG TABLET    Take 1 tablet (70 mg total) by mouth every 7 days.    CALCIUM CARBONATE-VIT D3- MG CALCIUM- 400 UNIT TAB    Take 1 tablet by mouth 2 (two) times daily.    OLMESARTAN-HYDROCHLOROTHIAZIDE (BENICAR HCT) 40-25 MG PER TABLET    Take 1 tablet by mouth once daily.   Discontinued Medications    BENZONATATE (TESSALON) 100 MG CAPSULE     Take 100 mg by mouth 3 (three) times daily.    IBUPROFEN (ADVIL,MOTRIN) 600 MG TABLET    Take 1 tablet (600 mg total) by mouth every 6 (six) hours as needed for Pain.    POLYETHYLENE GLYCOL (MIRALAX) 17 GRAM PWPK    Take 17 g by mouth once daily.

## 2024-05-27 ENCOUNTER — CLINICAL SUPPORT (OUTPATIENT)
Dept: PRIMARY CARE CLINIC | Facility: CLINIC | Age: 74
End: 2024-05-27
Payer: COMMERCIAL

## 2024-05-27 VITALS — DIASTOLIC BLOOD PRESSURE: 70 MMHG | SYSTOLIC BLOOD PRESSURE: 126 MMHG

## 2024-05-27 DIAGNOSIS — Z12.39 ENCOUNTER FOR SCREENING BREAST EXAMINATION: Primary | ICD-10-CM

## 2024-05-27 PROCEDURE — 99999 PR PBB SHADOW E&M-EST. PATIENT-LVL III: CPT | Mod: PBBFAC,,,

## 2024-05-27 NOTE — PROGRESS NOTES
Verified pt ID using name and . 2 week follow up BP check for Dr. Mancia, /70.  Patient is currently taking amlodipine 5 mg once daily and olmesartan-hydrochlorothazide 40-25 mg once daily. Patient denies N/V, headaches, dizziness, blurred vision. Informed patient to continue treatment as prescribed, keep log of BP, and give us a call for follow up if any changes occur. Patient verbalized understanding.

## 2024-08-14 ENCOUNTER — HOSPITAL ENCOUNTER (OUTPATIENT)
Dept: RADIOLOGY | Facility: HOSPITAL | Age: 74
Discharge: HOME | End: 2024-08-14
Attending: FAMILY MEDICINE
Payer: COMMERCIAL

## 2024-08-14 DIAGNOSIS — Z12.39 ENCOUNTER FOR SCREENING BREAST EXAMINATION: ICD-10-CM

## 2024-08-14 PROCEDURE — 77063 BREAST TOMOSYNTHESIS BI: CPT | Mod: TC

## 2025-05-23 ENCOUNTER — OFFICE VISIT (OUTPATIENT)
Dept: PRIMARY CARE CLINIC | Facility: CLINIC | Age: 75
End: 2025-05-23
Payer: COMMERCIAL

## 2025-05-23 VITALS
OXYGEN SATURATION: 99 % | WEIGHT: 149.25 LBS | HEIGHT: 63 IN | RESPIRATION RATE: 18 BRPM | DIASTOLIC BLOOD PRESSURE: 70 MMHG | HEART RATE: 58 BPM | BODY MASS INDEX: 26.45 KG/M2 | SYSTOLIC BLOOD PRESSURE: 124 MMHG | TEMPERATURE: 98 F

## 2025-05-23 DIAGNOSIS — Z12.31 ENCOUNTER FOR SCREENING MAMMOGRAM FOR BREAST CANCER: ICD-10-CM

## 2025-05-23 DIAGNOSIS — I61.9 THALAMIC HEMORRHAGE WITH STROKE: ICD-10-CM

## 2025-05-23 DIAGNOSIS — E78.5 HYPERLIPIDEMIA, UNSPECIFIED HYPERLIPIDEMIA TYPE: ICD-10-CM

## 2025-05-23 DIAGNOSIS — I69.351 HEMIPARESIS AFFECTING RIGHT SIDE AS LATE EFFECT OF CEREBROVASCULAR ACCIDENT (CVA): Primary | ICD-10-CM

## 2025-05-23 DIAGNOSIS — R73.03 PREDIABETES: ICD-10-CM

## 2025-05-23 DIAGNOSIS — I10 ESSENTIAL HYPERTENSION, BENIGN: ICD-10-CM

## 2025-05-23 PROBLEM — G81.91 RIGHT HEMIPARESIS: Status: ACTIVE | Noted: 2025-04-11

## 2025-05-23 PROCEDURE — 99999 PR PBB SHADOW E&M-EST. PATIENT-LVL IV: CPT | Mod: PBBFAC,,, | Performed by: FAMILY MEDICINE

## 2025-05-23 RX ORDER — SENNOSIDES 8.6 MG/1
2 TABLET ORAL NIGHTLY
Qty: 180 TABLET | Refills: 3 | Status: SHIPPED | OUTPATIENT
Start: 2025-05-23

## 2025-05-23 RX ORDER — SENNOSIDES 8.6 MG/1
2 TABLET ORAL NIGHTLY
COMMUNITY
Start: 2025-05-08 | End: 2025-05-23 | Stop reason: SDUPTHER

## 2025-05-23 RX ORDER — LOSARTAN POTASSIUM 100 MG/1
100 TABLET ORAL DAILY
Qty: 90 TABLET | Refills: 3 | Status: SHIPPED | OUTPATIENT
Start: 2025-05-23

## 2025-05-23 RX ORDER — ESCITALOPRAM OXALATE 10 MG/1
10 TABLET ORAL DAILY
COMMUNITY
Start: 2025-05-08 | End: 2025-05-23 | Stop reason: SDUPTHER

## 2025-05-23 RX ORDER — AMLODIPINE BESYLATE 10 MG/1
10 TABLET ORAL DAILY
Qty: 90 TABLET | Refills: 3 | Status: SHIPPED | OUTPATIENT
Start: 2025-05-23

## 2025-05-23 RX ORDER — LOSARTAN POTASSIUM 100 MG/1
100 TABLET ORAL DAILY
COMMUNITY
Start: 2025-05-08 | End: 2025-05-23 | Stop reason: SDUPTHER

## 2025-05-23 RX ORDER — CARVEDILOL 12.5 MG/1
12.5 TABLET ORAL 2 TIMES DAILY WITH MEALS
Qty: 180 TABLET | Refills: 3 | Status: SHIPPED | OUTPATIENT
Start: 2025-05-23

## 2025-05-23 RX ORDER — CARVEDILOL 12.5 MG/1
12.5 TABLET ORAL 2 TIMES DAILY WITH MEALS
COMMUNITY
Start: 2025-05-08 | End: 2025-05-23 | Stop reason: SDUPTHER

## 2025-05-23 RX ORDER — ATORVASTATIN CALCIUM 80 MG/1
80 TABLET, FILM COATED ORAL DAILY
Qty: 90 TABLET | Refills: 3 | Status: SHIPPED | OUTPATIENT
Start: 2025-05-23

## 2025-05-23 RX ORDER — ESCITALOPRAM OXALATE 5 MG/1
5 TABLET ORAL DAILY
Qty: 14 TABLET | Refills: 0 | Status: SHIPPED | OUTPATIENT
Start: 2025-05-23 | End: 2025-06-06

## 2025-05-23 RX ORDER — ATORVASTATIN CALCIUM 80 MG/1
80 TABLET, FILM COATED ORAL DAILY
COMMUNITY
Start: 2025-05-08 | End: 2025-05-23 | Stop reason: SDUPTHER

## 2025-05-23 NOTE — PROGRESS NOTES
Assessment:       1. Hemiparesis affecting right side as late effect of cerebrovascular accident (CVA)    2. Essential hypertension, benign    3. Thalamic hemorrhage with stroke    4. Hyperlipidemia, unspecified hyperlipidemia type    5. Encounter for screening mammogram for breast cancer    6. Prediabetes         Plan:       Hemiparesis affecting right side as late effect of cerebrovascular accident (CVA)    Essential hypertension, benign  -     amLODIPine (NORVASC) 10 MG tablet; Take 1 tablet (10 mg total) by mouth once daily.  Dispense: 90 tablet; Refill: 3  -     carvediloL (COREG) 12.5 MG tablet; Take 1 tablet (12.5 mg total) by mouth 2 (two) times daily with meals.  Dispense: 180 tablet; Refill: 3  -     losartan (COZAAR) 100 MG tablet; Take 1 tablet (100 mg total) by mouth once daily.  Dispense: 90 tablet; Refill: 3    Thalamic hemorrhage with stroke    Hyperlipidemia, unspecified hyperlipidemia type  -     atorvastatin (LIPITOR) 80 MG tablet; Take 1 tablet (80 mg total) by mouth once daily.  Dispense: 90 tablet; Refill: 3  -     CBC Auto Differential; Future; Expected date: 08/23/2025  -     Comprehensive Metabolic Panel; Future; Expected date: 08/23/2025  -     Lipid Panel; Future; Expected date: 08/23/2025    Encounter for screening mammogram for breast cancer  -     Mammo Digital Screening Bilat w/ Jeremy (XPD); Future; Expected date: 08/23/2025    Prediabetes  -     Hemoglobin A1C; Future; Expected date: 08/23/2025    Other orders  -     EScitalopram oxalate (LEXAPRO) 5 MG Tab; Take 1 tablet (5 mg total) by mouth once daily. for 14 days  Dispense: 14 tablet; Refill: 0  -     senna (SENOKOT) 8.6 mg tablet; Take 2 tablets by mouth every evening.  Dispense: 180 tablet; Refill: 3      Assessment & Plan    - Assessed post-stroke status following recent hospital discharge and inpatient rehabilitation.    ## CEREBRAL INFARCTION (STROKE):   Patient experienced a left-sided thalamic hemorrhagic stroke with sudden  onset of right-sided weakness and trouble speaking.   Patient was hypertensive on arrival to the ER.   Head CT showed a stable hemorrhagic stroke.   Patient was admitted to neurocritical care for blood pressure control and monitoring, then discharged to [facility] for inpatient rehabilitation.   Patient is now completing home health services and is about to be discharged from this care.    ## RIGHT-SIDED HEMIPARESIS:   Neurologic exam revealed mild right mckenzie-paresis, particularly pronounced in the arm.   Patient is showing improvement in strength.   Instructed to continue prescribed strengthening exercises for the right side and recommended continuing outpatient therapy for ongoing weakness.    ## HYPERTENSION:   Patient requires strict blood pressure control following stroke.   Maintaining current antihypertensive regimen with amlodipine 10 mg daily, losartan, and carvedilol for optimal BP management.   Emphasized importance of adherence to all three medications.   Sent refills for a one-year supply.    ## DEPRESSION:   Patient reports intermittent depression with some good days.   Currently on Lexapro, but due to improving mood, will taper off medication over the next 2 weeks.   Plan: decrease to 5 mg daily for 2 weeks then discontinue.   Rationale was explained to patient.    ## HYPERLIPIDEMIA:   Continue atorvastatin for cholesterol management.    ## ORTHOSTATIC HYPOTENSION:   Advised patient on proper techniques for changing positions, particularly rising slowly from seated position to standing to prevent dizziness and lightheadedness.    ## FOLLOW-UP AND ADDITIONAL INSTRUCTIONS:   Discontinued Senokot as constipation has resolved; patient may take daily as needed if symptoms return.   Ordered labs for mid-August.   Schedule mammogram after August 14th, potentially coordinated with labs and follow-up visit.   Patient to follow up in 3 months, a few days after mid-August labs.       Medication List with  Changes/Refills   Changed and/or Refilled Medications    Modified Medication Previous Medication    AMLODIPINE (NORVASC) 10 MG TABLET amLODIPine (NORVASC) 5 MG tablet       Take 1 tablet (10 mg total) by mouth once daily.    Take 1 tablet (5 mg total) by mouth once daily.    ATORVASTATIN (LIPITOR) 80 MG TABLET atorvastatin (LIPITOR) 80 MG tablet       Take 1 tablet (80 mg total) by mouth once daily.    Take 80 mg by mouth once daily.    CARVEDILOL (COREG) 12.5 MG TABLET carvediloL (COREG) 12.5 MG tablet       Take 1 tablet (12.5 mg total) by mouth 2 (two) times daily with meals.    Take 12.5 mg by mouth 2 (two) times daily with meals.    ESCITALOPRAM OXALATE (LEXAPRO) 5 MG TAB EScitalopram oxalate (LEXAPRO) 10 MG tablet       Take 1 tablet (5 mg total) by mouth once daily. for 14 days    Take 10 mg by mouth once daily.    LOSARTAN (COZAAR) 100 MG TABLET losartan (COZAAR) 100 MG tablet       Take 1 tablet (100 mg total) by mouth once daily.    Take 100 mg by mouth once daily.    SENNA (SENOKOT) 8.6 MG TABLET senna (SENOKOT) 8.6 mg tablet       Take 2 tablets by mouth every evening.    Take 2 tablets by mouth every evening.   Discontinued Medications    ALENDRONATE (FOSAMAX) 70 MG TABLET    Take 1 tablet (70 mg total) by mouth every 7 days.    CALCIUM CARBONATE-VIT D3- MG CALCIUM- 400 UNIT TAB    Take 1 tablet by mouth 2 (two) times daily.    GLYCOPYRROLATE (ROBINUL) 1 MG TAB    Take 1 tablet (1 mg total) by mouth 2 (two) times daily.    OLMESARTAN-HYDROCHLOROTHIAZIDE (BENICAR HCT) 40-25 MG PER TABLET    Take 1 tablet by mouth once daily.         Subjective:    Patient ID: Adelina Ya is a 74 y.o. female.  Chief Complaint: Hospital Follow Up    HPI  History of Present Illness    CHIEF COMPLAINT:  Patient presents today for follow up after hospitalization for stroke    HISTORY OF PRESENT ILLNESS:  She presented with sudden onset of right-sided weakness and difficulty speaking, and was hypertensive upon  "emergency room arrival. She was discharged from rehabilitation facility two weeks ago. She continues to experience right-sided weakness and reports feeling dizzy with positional changes. She describes variable appetite and endorses feeling depressed. She ran out of blood pressure medications prior to the stroke event.    HOME HEALTH SERVICES:  She is currently receiving home health services and is expected to be discharged next week due to completion of prescribed exercises and interventions.      ROS:  Constitutional: +appetite changes, +malaise  ENT: -difficulty swallowing  Gastrointestinal: -constipation  Neurological: +weakness, +speech difficulty  Psychiatric: +depression       Review of Systems    Objective:      Vitals:    05/23/25 1008   BP: 124/70   BP Location: Left arm   Patient Position: Sitting   Pulse: (!) 58   Resp: 18   Temp: 98 °F (36.7 °C)   TempSrc: Oral   SpO2: 99%   Weight: 67.7 kg (149 lb 4 oz)   Height: 5' 3" (1.6 m)     BP Readings from Last 5 Encounters:   05/23/25 124/70   12/15/24 135/73   05/27/24 126/70   05/13/24 (!) 148/86   03/05/24 (!) 167/84     Wt Readings from Last 5 Encounters:   05/23/25 67.7 kg (149 lb 4 oz)   12/15/24 68.6 kg (151 lb 3.8 oz)   05/13/24 70.5 kg (155 lb 8.6 oz)   03/05/24 71.1 kg (156 lb 12 oz)   01/08/24 71.4 kg (157 lb 6.5 oz)     Physical Exam  Physical Exam    General: Well-developed. Well-nourished. No acute distress.  Eyes: EOMI. Sclerae anicteric.  HENT: Normocephalic. Atraumatic. Nares patent. Moist oral mucosa.  Cardiovascular: Regular rate. Regular rhythm. No murmurs. No rubs. No gallops. Normal S1, S2.  Respiratory: Normal respiratory effort. Clear to auscultation bilaterally. No rales. No rhonchi. No wheezing.  Musculoskeletal: No  obvious deformity.  Extremities: No lower extremity edema.  Neurological: Alert & oriented x3. No slurred speech. Normal gait. Mild right mckenzie-paresis.  Psychiatric: Normal mood. Normal affect. Good insight. Good " judgment.  Skin: Warm. Dry. No rash.         Lab Results   Component Value Date    WBC 8.4 04/14/2025    HGB 13.9 04/14/2025    HCT 42.1 04/14/2025     01/08/2024    CHOL 225 (H) 04/11/2025    TRIG 51 04/11/2025    HDL 59 04/11/2025    ALT 10 04/17/2025    AST 22 04/17/2025     05/08/2025    K 3.9 05/08/2025     01/08/2024    CREATININE 0.69 05/08/2025    BUN 12.7 05/08/2025    CO2 28 05/08/2025    TSH 1.40 04/11/2025    INR 1.1 04/15/2025    HGBA1C 5.7 (H) 04/12/2025      This note was generated with the assistance of ambient listening technology. Verbal consent was obtained by the patient and accompanying visitor(s) for the recording of patient appointment to facilitate this note. I attest to having reviewed and edited the generated note for accuracy, though some syntax or spelling errors may persist. Please contact the author of this note for any clarification.

## 2025-08-20 ENCOUNTER — OFFICE VISIT (OUTPATIENT)
Dept: PRIMARY CARE CLINIC | Facility: CLINIC | Age: 75
End: 2025-08-20
Payer: COMMERCIAL

## 2025-08-20 VITALS
RESPIRATION RATE: 18 BRPM | BODY MASS INDEX: 28.8 KG/M2 | TEMPERATURE: 98 F | DIASTOLIC BLOOD PRESSURE: 76 MMHG | WEIGHT: 162.56 LBS | HEIGHT: 63 IN | SYSTOLIC BLOOD PRESSURE: 126 MMHG | OXYGEN SATURATION: 99 % | HEART RATE: 60 BPM

## 2025-08-20 DIAGNOSIS — R73.03 PREDIABETES: ICD-10-CM

## 2025-08-20 DIAGNOSIS — I69.351 HEMIPARESIS AFFECTING RIGHT SIDE AS LATE EFFECT OF CEREBROVASCULAR ACCIDENT (CVA): Primary | ICD-10-CM

## 2025-08-20 DIAGNOSIS — I69.359 HISTORY OF HEMORRHAGIC STROKE WITH RESIDUAL HEMIPARESIS: ICD-10-CM

## 2025-08-20 DIAGNOSIS — I10 ESSENTIAL HYPERTENSION, BENIGN: ICD-10-CM

## 2025-08-20 DIAGNOSIS — E78.2 MIXED HYPERLIPIDEMIA: ICD-10-CM

## 2025-08-20 PROBLEM — I61.9 THALAMIC HEMORRHAGE WITH STROKE: Status: RESOLVED | Noted: 2025-04-16 | Resolved: 2025-08-20

## 2025-08-20 PROCEDURE — 3288F FALL RISK ASSESSMENT DOCD: CPT | Mod: CPTII,S$GLB,, | Performed by: FAMILY MEDICINE

## 2025-08-20 PROCEDURE — 1160F RVW MEDS BY RX/DR IN RCRD: CPT | Mod: CPTII,S$GLB,, | Performed by: FAMILY MEDICINE

## 2025-08-20 PROCEDURE — 4010F ACE/ARB THERAPY RXD/TAKEN: CPT | Mod: CPTII,S$GLB,, | Performed by: FAMILY MEDICINE

## 2025-08-20 PROCEDURE — 1126F AMNT PAIN NOTED NONE PRSNT: CPT | Mod: CPTII,S$GLB,, | Performed by: FAMILY MEDICINE

## 2025-08-20 PROCEDURE — 1159F MED LIST DOCD IN RCRD: CPT | Mod: CPTII,S$GLB,, | Performed by: FAMILY MEDICINE

## 2025-08-20 PROCEDURE — 3078F DIAST BP <80 MM HG: CPT | Mod: CPTII,S$GLB,, | Performed by: FAMILY MEDICINE

## 2025-08-20 PROCEDURE — 99214 OFFICE O/P EST MOD 30 MIN: CPT | Mod: S$GLB,,, | Performed by: FAMILY MEDICINE

## 2025-08-20 PROCEDURE — 99999 PR PBB SHADOW E&M-EST. PATIENT-LVL III: CPT | Mod: PBBFAC,,, | Performed by: FAMILY MEDICINE

## 2025-08-20 PROCEDURE — 3044F HG A1C LEVEL LT 7.0%: CPT | Mod: CPTII,S$GLB,, | Performed by: FAMILY MEDICINE

## 2025-08-20 PROCEDURE — 1101F PT FALLS ASSESS-DOCD LE1/YR: CPT | Mod: CPTII,S$GLB,, | Performed by: FAMILY MEDICINE

## 2025-08-20 PROCEDURE — 3008F BODY MASS INDEX DOCD: CPT | Mod: CPTII,S$GLB,, | Performed by: FAMILY MEDICINE

## 2025-08-20 PROCEDURE — 3074F SYST BP LT 130 MM HG: CPT | Mod: CPTII,S$GLB,, | Performed by: FAMILY MEDICINE

## 2025-08-27 ENCOUNTER — HOSPITAL ENCOUNTER (OUTPATIENT)
Dept: RADIOLOGY | Facility: HOSPITAL | Age: 75
Discharge: HOME | End: 2025-08-27
Attending: FAMILY MEDICINE
Payer: COMMERCIAL

## 2025-08-27 DIAGNOSIS — Z12.31 ENCOUNTER FOR SCREENING MAMMOGRAM FOR BREAST CANCER: ICD-10-CM

## 2025-08-27 PROCEDURE — 77063 BREAST TOMOSYNTHESIS BI: CPT | Mod: TC
